# Patient Record
Sex: MALE | Race: WHITE | NOT HISPANIC OR LATINO | Employment: OTHER | URBAN - METROPOLITAN AREA
[De-identification: names, ages, dates, MRNs, and addresses within clinical notes are randomized per-mention and may not be internally consistent; named-entity substitution may affect disease eponyms.]

---

## 2017-12-04 ENCOUNTER — ALLSCRIPTS OFFICE VISIT (OUTPATIENT)
Dept: OTHER | Facility: OTHER | Age: 62
End: 2017-12-04

## 2017-12-05 ENCOUNTER — GENERIC CONVERSION - ENCOUNTER (OUTPATIENT)
Dept: GASTROENTEROLOGY | Facility: CLINIC | Age: 62
End: 2017-12-05

## 2018-01-23 NOTE — MISCELLANEOUS
Provider Comments  Provider Comments:   Dear MR Cuba Donovan have missed your appointment with Dr Viri Aldana on 12/4/17  Please call our office to reschedule at [phone]      Thank you,  [facility]      Signatures   Electronically signed by : Rupal Valenzuela MD; Dec  5 2017 11:29AM EST                       (Author)

## 2018-03-07 NOTE — PROGRESS NOTES
"  Discussion/Summary  Normal device function      Results/Data  Results   Cardiac Device In Clinic [id number] 11:29AM Sonam Blue     Test Name Result Flag Reference   MISCELLANEOUS COMMENT      DEVICE INTERROGATED IN THE [facility] OFFICE  BATTERY VOLTAGE ADEQUATE  NO SIGNIFICANT HIGH RATE EPISODES  ALL LEAD PARAMETERS WITHIN NORMAL LIMITS   1%  OPTI-VOL WITHIN NORMAL LIMITS  NORMAL DEVICE FUNCTION  --ARMANDO   Cardiac Electrophysiology Report      [id number]  pdf   DEVICE TYPE ICD       Cardiac Electrophysiology Report [id number] 11:29AM Sonam Blue     Test Name Result Flag Reference   Cardiac Electrophysiology Report      [id number]  pdf     Signatures   Electronically signed by : Adalid Gutierrez, ; Mar 30 2016  8:37AM EST                       (Author)    Electronically signed by : Amber Calvillo DO;  Apr  3 2016 12:21PM EST                       (Author)    "

## 2018-09-25 ENCOUNTER — TELEPHONE (OUTPATIENT)
Dept: GASTROENTEROLOGY | Facility: CLINIC | Age: 63
End: 2018-09-25

## 2018-09-25 NOTE — TELEPHONE ENCOUNTER
Clarisse Travis from [address] [place] [address] called to ask that we call him to schedule colon consult---left  with callback #

## 2018-09-28 ENCOUNTER — TELEPHONE (OUTPATIENT)
Dept: GASTROENTEROLOGY | Facility: MEDICAL CENTER | Age: 63
End: 2018-09-28

## 2018-09-28 NOTE — TELEPHONE ENCOUNTER
Adonay limon from the [facility] called - she said that Mr Jonna Mejia does not need a colonoscopy  He needs  a EGD/EUS/with EMR  Angelique's phone number is [phone]  She said she has faxed us the orders several times  I do see [facility] paperwork scanned in on 9/25/18

## 2018-10-02 NOTE — TELEPHONE ENCOUNTER
Dr Lowry,    Please look at message below  Is it ok to schedule procedure or office  Visit? Please Advise  Thanks!

## 2018-10-04 NOTE — TELEPHONE ENCOUNTER
Zane Iniguez,    Please schedule him for an office visit with Snow Lora, or me   I don't see any clinical info for reason for EUS and it looks like he has a significant cardiac history so I think it would be best for us to see him first     Thanks,    Alesha Reyes

## 2018-10-09 NOTE — TELEPHONE ENCOUNTER
I spoke with pt he's aware his appt from Dec with Dr Mishra has been rescheduled with Dr Vee on 11/2/2018  Abiola levin'lara to [employer]

## 2018-11-02 ENCOUNTER — OFFICE VISIT (OUTPATIENT)
Dept: GASTROENTEROLOGY | Facility: AMBULARY SURGERY CENTER | Age: 63
End: 2018-11-02
Payer: OTHER GOVERNMENT

## 2018-11-02 VITALS
TEMPERATURE: 98.5 F | DIASTOLIC BLOOD PRESSURE: 80 MMHG | RESPIRATION RATE: 18 BRPM | WEIGHT: 267.2 LBS | HEART RATE: 58 BPM | HEIGHT: 73 IN | BODY MASS INDEX: 35.41 KG/M2 | SYSTOLIC BLOOD PRESSURE: 120 MMHG

## 2018-11-02 DIAGNOSIS — D13.2 ADENOMATOUS DUODENAL POLYP: Primary | ICD-10-CM

## 2018-11-02 PROCEDURE — 99204 OFFICE O/P NEW MOD 45 MIN: CPT | Performed by: INTERNAL MEDICINE

## 2018-11-02 RX ORDER — EPLERENONE 25 MG/1
25 TABLET, FILM COATED ORAL DAILY
COMMUNITY

## 2018-11-02 RX ORDER — LISINOPRIL 40 MG/1
1 TABLET ORAL DAILY
COMMUNITY
Start: 2013-08-28

## 2018-11-02 RX ORDER — METOPROLOL SUCCINATE 100 MG/1
1 TABLET, EXTENDED RELEASE ORAL
COMMUNITY
Start: 2015-12-10

## 2018-11-02 RX ORDER — BIOTIN 1 MG
TABLET ORAL
COMMUNITY
Start: 2014-01-27

## 2018-11-02 RX ORDER — ATORVASTATIN CALCIUM 80 MG/1
1 TABLET, FILM COATED ORAL DAILY
COMMUNITY
Start: 2014-01-30

## 2018-11-02 RX ORDER — ROSUVASTATIN CALCIUM 40 MG/1
40 TABLET, COATED ORAL DAILY
COMMUNITY

## 2018-11-02 RX ORDER — AMLODIPINE BESYLATE 5 MG/1
1 TABLET ORAL DAILY
COMMUNITY
Start: 2016-05-25

## 2018-11-02 NOTE — PROGRESS NOTES
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 which she has been referred  1   Duodenal adenoma: unfortunately do not have the endoscopy report however biopsies show an adenoma  -we will schedule patient for an endoscopic ultrasound with possible EMR duodenal lesion  -discussed with him the risks of the procedure including bleeding, surgery perforation        _____________________________________________________________        CC:  Duodenal polyp    HPI:  Grey Cee is a 66 y o male who was referred for evaluation of duodenal adenoma  This is a 42-year-old gentle with history of hypertension, aortic dissection with aneurysmal dilation of his mesenteric vasculature, apparently had recent upper endoscopy which demonstrated adenomatous polyp in his duodenum  He do not have the actual endoscopy report with biopsies are available to us  He has had recent imaging which demonstrated the dissection and aneurysmal dilation  The he is on multiple antihypertensive medications continues smoke a pack a day  Denies any abdominal pain, nausea, vomiting, diarrhea, constipation, melena, rectal bleeding  Previously worked in construction  Patient is unable to provide exact history findings on his endoscopy  ROS:  The remainder of the ROS was negative except for the pertinent positives mentioned in HPI  Allergies: Patient has no known allergies      Medications:   Current Outpatient Prescriptions:     amLODIPine (NORVASC) 5 mg tablet, Take 1 tablet by mouth daily, Disp: , Rfl:     aspirin 81 MG tablet, Take 1 tablet by mouth daily, Disp: , Rfl:     atorvastatin (LIPITOR) 80 mg tablet, Take 1 tablet by mouth daily, Disp: , Rfl:     Cholecalciferol (VITAMIN D3) 1000 units CAPS, Take by mouth, Disp: , Rfl:     cyanocobalamin 100 MCG tablet, Take 100 mcg by mouth daily, Disp: , Rfl:     eplerenone (INSPRA) 25 mg tablet, Take 25 mg by mouth daily, Disp: , Rfl:     ERGOCALCIFEROL PO, Take by mouth, Disp: , Rfl:     lisinopril (ZESTRIL) 40 mg tablet, Take 1 tablet by mouth daily, Disp: , Rfl:     metFORMIN (GLUCOPHAGE) 500 mg tablet, Take 1 tablet by mouth 2 (two) times a day, Disp: , Rfl:     metoprolol succinate (TOPROL-XL) 100 mg 24 hr tablet, Take 1 tablet by mouth, Disp: , Rfl:     rosuvastatin (CRESTOR) 40 MG tablet, Take 40 mg by mouth daily, Disp: , Rfl: '    No past medical history on file  No past surgical history on file  No family history on file  reports that he has been smoking  He has never used smokeless tobacco  He reports that he drinks alcohol  He reports that he does not use drugs            Physical Exam:     /80 (BP Location: Left arm, Patient Position: Sitting, Cuff Size: Standard)   Pulse 58   Temp 98 5 °F (36 9 °C) (Tympanic)   Resp 18   Ht 6' 1" (1 854 m)   Wt 121 kg (267 lb 3 2 oz)   BMI 35 25 kg/m²     Gen: wn/wd, NAD, overweight  HEENT: anicteric, MMM, no cervical LAD  CVS: RRR, no m/r/g  CHEST: CTA b/l  ABD: +BS, soft, , ventral hernia NT,ND, no hepatosplenomegaly  EXT: no c/c/e  NEURO: aaox3  SKIN: NO rashes

## 2018-11-02 NOTE — LETTER
November 2, 2018     [address] [place] [address] Drive  [address] [place] [id number]    Patient: Lennie Childers   YOB: 1955   Date of Visit: 11/2/2018       Dear Dr Kami Moise: Thank you for referring Bebeto Tran to me for evaluation  Below are my notes for this consultation  If you have questions, please do not hesitate to call me  I look forward to following your patient along with you  Sincerely,        Krys Cm MD        CC: No Recipients  Krys Cm MD  11/2/2018  6:00 PM  Sign at close encounter  Consultation - [facility]  Lennie Childers 66 y o  male MRN: [id number]          Assessment & Plan:    Pleasant 66year-old gentle with a history of aortic dissection and aneurysmal dilation which has been followed  On recent endoscopy found to have a 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 which she has been referred  1   Duodenal adenoma: unfortunately do not have the endoscopy report however biopsies show an adenoma  -we will schedule patient for an endoscopic ultrasound with possible EMR duodenal lesion  -discussed with him the risks of the procedure including bleeding, surgery perforation        _____________________________________________________________        CC:  Duodenal polyp    HPI:  Lennie Childers is a 66 y o male who was referred for evaluation of duodenal adenoma  This is a 81-year-old gentle with history of hypertension, aortic dissection with aneurysmal dilation of his mesenteric vasculature, apparently had recent upper endoscopy which demonstrated adenomatous polyp in his duodenum  He do not have the actual endoscopy report with biopsies are available to us  He has had recent imaging which demonstrated the dissection and aneurysmal dilation  The he is on multiple antihypertensive medications continues smoke a pack a day  Denies any abdominal pain, nausea, vomiting, diarrhea, constipation, melena, rectal bleeding    Previously worked in construction  Patient is unable to provide exact history findings on his endoscopy  ROS:  The remainder of the ROS was negative except for the pertinent positives mentioned in HPI  Allergies: Patient has no known allergies  Medications:   Current Outpatient Prescriptions:     amLODIPine (NORVASC) 5 mg tablet, Take 1 tablet by mouth daily, Disp: , Rfl:     aspirin 81 MG tablet, Take 1 tablet by mouth daily, Disp: , Rfl:     atorvastatin (LIPITOR) 80 mg tablet, Take 1 tablet by mouth daily, Disp: , Rfl:     Cholecalciferol (VITAMIN D3) 1000 units CAPS, Take by mouth, Disp: , Rfl:     cyanocobalamin 100 MCG tablet, Take 100 mcg by mouth daily, Disp: , Rfl:     eplerenone (INSPRA) 25 mg tablet, Take 25 mg by mouth daily, Disp: , Rfl:     ERGOCALCIFEROL PO, Take by mouth, Disp: , Rfl:     lisinopril (ZESTRIL) 40 mg tablet, Take 1 tablet by mouth daily, Disp: , Rfl:     metFORMIN (GLUCOPHAGE) 500 mg tablet, Take 1 tablet by mouth 2 (two) times a day, Disp: , Rfl:     metoprolol succinate (TOPROL-XL) 100 mg 24 hr tablet, Take 1 tablet by mouth, Disp: , Rfl:     rosuvastatin (CRESTOR) 40 MG tablet, Take 40 mg by mouth daily, Disp: , Rfl: '    No past medical history on file  No past surgical history on file  No family history on file  reports that he has been smoking  He has never used smokeless tobacco  He reports that he drinks alcohol  He reports that he does not use drugs            Physical Exam:     /80 (BP Location: Left arm, Patient Position: Sitting, Cuff Size: Standard)   Pulse 58   Temp 98 5 °F (36 9 °C) (Tympanic)   Resp 18   Ht 6' 1" (1 854 m)   Wt 121 kg (267 lb 3 2 oz)   BMI 35 25 kg/m²      Gen: wn/wd, NAD, overweight  HEENT: anicteric, MMM, no cervical LAD  CVS: RRR, no m/r/g  CHEST: CTA b/l  ABD: +BS, soft, , ventral hernia NT,ND, no hepatosplenomegaly  EXT: no c/c/e  NEURO: aaox3  SKIN: NO rashes

## 2018-11-15 ENCOUNTER — TELEPHONE (OUTPATIENT)
Dept: GASTROENTEROLOGY | Facility: CLINIC | Age: 63
End: 2018-11-15

## 2018-11-15 NOTE — TELEPHONE ENCOUNTER
----- Message from Roro Castro sent at 11/5/2018  9:46 AM EST -----  Regarding: RADIAL EUS  Contact: [phone]  Good Morning Vivien Greenberg- per Dr Frankey Bryant can you please schedule this pt for RADIAL EUS W/ POLYPECTOMY FOR (DUODENAL POLYP)  PER [place] it doesn;t look like Dr Jasper Dumas has any time at the [facility] so she said to forward this to you  Thank you so much!

## 2018-11-20 ENCOUNTER — TELEPHONE (OUTPATIENT)
Dept: GASTROENTEROLOGY | Facility: AMBULARY SURGERY CENTER | Age: 63
End: 2018-11-20

## 2018-11-28 NOTE — TELEPHONE ENCOUNTER
EUS scheduled with Dr Andrea in [place] on 12/26/2018  I gave pt verbal instructions/mailed   Pt is Diabetic

## 2018-12-25 ENCOUNTER — ANESTHESIA EVENT (OUTPATIENT)
Dept: GASTROENTEROLOGY | Facility: HOSPITAL | Age: 63
End: 2018-12-25
Payer: OTHER GOVERNMENT

## 2018-12-25 NOTE — ANESTHESIA PREPROCEDURE EVALUATION
Review of Systems/Medical History  Patient summary reviewed  Chart reviewed  No history of anesthetic complications     Cardiovascular  EKG reviewed, Exercise tolerance (METS): >4, Exercise comment: Walks regularly at the gym for ~1 hr Pacemaker/AICD, Hyperlipidemia, Hypertension controlled, Valvular heart disease (mild-moderate MR) , mitral regurgitation, Past MI (~2013) > 6 months, CAD , CHF (EF ~25%) compensated CHF, Aortic disease (thoracoabdominal aortic aneurysm with chronic dissection),   Comment: Echo (5/12/14):  LEFT VENTRICLE:   The ventricle was markedly dilated  Systolic function was severely reduced  Ejection fraction was estimated to be   22 % (Ejection fraction by fractional shortening 26%, EF by Teichholz 27%, by   Wolm Abide 22%, EF by disc summation method 25%, myocardial performance   index 0 81 (normal 0 39+/_ 0 05), +dP/dt (LV contractality) 696mmHg/s (normal   =/> 4556), LV peak systolic longitudinal strain 7 1%)  LV stroke volume 71 ml, CO 4 L/min, CI 1 65 L/min/m2)  There was akinesis of the apical anterior, basal-mid anteroseptal, basal-mid   inferoseptal, apical inferior, apical septal, and apical wall(s)  Mass was   definitely increased  The changes were consistent with eccentric hypertrophy   (LV mass 306 gm, LVMI 124 r/m2, RWT 0 29)  Features were consistent with a   pseudonormal left ventricular filling pattern, with concomitant abnormal   relaxation and increased filling pressure (grade 2 diastolic dysfunction)  RIGHT VENTRICLE:   The size was normal    Systolic function was normal    Wall thickness was mildly to moderately increased  LEFT ATRIUM:   The atrium was moderately dilated (calculated LA volume 90 ml, DEDRICK 37 ml/m2)  MITRAL VALVE:   There was mild to moderate regurgitation  Regurgitation grade was 1-2+ on a scale of 0 to 4+  AORTA:   The root exhibited mild dilatation ( maximum AP diameter 40 mm) and mild   fibrocalcific change     There was mild dilatation of the abdominal aorta (maximum AP diameter 43 mm)  Stress Test (9/12/14):  SUMMARY:   -  Rest ECG: Sinus bradycardia  There was evidence of an old, anterior   myocardial infarction  -  Stress results: Duration of pharmacologic stress was   3 min  There was no chest pain during stress  -  ECG conclusions: The stress   ECG was equivocal for ischemia  Arrhythmia during stress: isolated premature   ventricular beats  -  Perfusion imaging: The left ventricle was severely   dilated  There was a large, severe, partially reversible myocardial perfusion   defect of the entire anteroapical wall  -  Gated SPECT: The calculated left   ventricular ejection fraction was 25 %  There was moderate global left   ventricular hypokinesis  There was severely reduced myocardial thickening and   motion of the apical wall and anterior wall of the left ventricle  CTA Chest (5/14/15):  1   Stable ascending aortic aneurysm measuring 41 mm    2   Interval growth of the descending thoracic aorta at the level of   the chronic dissection  Previously, the aorta measured 48 x 40 mm, and   currently measures 49 x 50 mm     3   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    ,  Pulmonary  Smoker cigarette smoker  , Tobacco cessation counseling given ,        GI/Hepatic      Comment: Duodenal adenoma     Negative  ROS        Endo/Other  Diabetes type 2 Oral agent,   Obesity (BMI 35 2)  morbid obesity   GYN       Hematology  Negative hematology ROS      Musculoskeletal  Negative musculoskeletal ROS        Neurology  Negative neurology ROS      Psychology   Negative psychology ROS              Physical Exam    Airway    Mallampati score: II  TM Distance: >3 FB  Neck ROM: full     Dental   Comment: Multiple pre-existing missing teeth,     Cardiovascular  Rhythm: regular, Rate: normal, Cardiovascular exam normal    Pulmonary  Pulmonary exam normal Breath sounds clear to auscultation,     Other Findings        Anesthesia Plan  ASA Score- 4     Anesthesia Type- IV sedation with anesthesia with ASA Monitors  Additional Monitors:   Airway Plan:         Plan Factors- Patient instructed to abstain from smoking on day of procedure  Patient smoked on day of surgery  Induction- intravenous  Postoperative Plan-     Informed Consent- Anesthetic plan and risks discussed with patient  I personally reviewed this patient with the CRNA  Discussed and agreed on the Anesthesia Plan with the CRNA         NPO verified  NKDA  Patient takes metoprolol XL and last took it yesterday morning  Will give IV BB intraop as hemodynamics permit  AICD was interrogated pre-op today (12/26/18) by [employer] representative  Battery life is good, patient is not pacer dependent, and patient has never been defibrillated by his device  Will place magnet on AICD as given that GI will be using cautery for the procedure  As per [employer] rep, no need for interrogation post-op after magnet removal     Plan:  IV sedation/MAC; GA as backup    Risks and benefits discussed with patient  Questions answered  Patient consented

## 2018-12-26 ENCOUNTER — ANESTHESIA (OUTPATIENT)
Dept: GASTROENTEROLOGY | Facility: HOSPITAL | Age: 63
End: 2018-12-26
Payer: OTHER GOVERNMENT

## 2018-12-26 ENCOUNTER — HOSPITAL ENCOUNTER (OUTPATIENT)
Facility: HOSPITAL | Age: 63
Setting detail: OUTPATIENT SURGERY
Discharge: HOME | End: 2018-12-26
Attending: INTERNAL MEDICINE | Admitting: INTERNAL MEDICINE
Payer: OTHER GOVERNMENT

## 2018-12-26 VITALS
RESPIRATION RATE: 16 BRPM | OXYGEN SATURATION: 94 % | HEIGHT: 73 IN | HEART RATE: 64 BPM | SYSTOLIC BLOOD PRESSURE: 122 MMHG | WEIGHT: 260 LBS | DIASTOLIC BLOOD PRESSURE: 74 MMHG | TEMPERATURE: 98.3 F | BODY MASS INDEX: 34.46 KG/M2

## 2018-12-26 DIAGNOSIS — D13.2 ADENOMATOUS DUODENAL POLYP: ICD-10-CM

## 2018-12-26 LAB — GLUCOSE SERPL-MCNC: 103 MG/DL (ref 65–140)

## 2018-12-26 PROCEDURE — 88305 TISSUE EXAM BY PATHOLOGIST: CPT | Performed by: PATHOLOGY

## 2018-12-26 PROCEDURE — 82948 REAGENT STRIP/BLOOD GLUCOSE: CPT

## 2018-12-26 PROCEDURE — 43237 ENDOSCOPIC US EXAM ESOPH: CPT | Performed by: INTERNAL MEDICINE

## 2018-12-26 PROCEDURE — 43254 EGD ENDO MUCOSAL RESECTION: CPT | Performed by: INTERNAL MEDICINE

## 2018-12-26 RX ORDER — PROPOFOL 10 MG/ML
INJECTION, EMULSION INTRAVENOUS CONTINUOUS PRN
Status: DISCONTINUED | OUTPATIENT
Start: 2018-12-26 | End: 2018-12-26 | Stop reason: SURG

## 2018-12-26 RX ORDER — SODIUM CHLORIDE 9 MG/ML
100 INJECTION, SOLUTION INTRAVENOUS CONTINUOUS
Status: DISCONTINUED | OUTPATIENT
Start: 2018-12-26 | End: 2018-12-26 | Stop reason: HOSPADM

## 2018-12-26 RX ORDER — PROPOFOL 10 MG/ML
INJECTION, EMULSION INTRAVENOUS AS NEEDED
Status: DISCONTINUED | OUTPATIENT
Start: 2018-12-26 | End: 2018-12-26 | Stop reason: SURG

## 2018-12-26 RX ORDER — PROPOFOL 10 MG/ML
INJECTION, EMULSION INTRAVENOUS CONTINUOUS PRN
Status: DISCONTINUED | OUTPATIENT
Start: 2018-12-26 | End: 2018-12-26

## 2018-12-26 RX ORDER — EPINEPHRINE 1 MG/ML
INJECTION, SOLUTION, CONCENTRATE INTRAVENOUS AS NEEDED
Status: DISCONTINUED | OUTPATIENT
Start: 2018-12-26 | End: 2018-12-26 | Stop reason: HOSPADM

## 2018-12-26 RX ADMIN — PROPOFOL 150 MG: 10 INJECTION, EMULSION INTRAVENOUS at 11:24

## 2018-12-26 RX ADMIN — PROPOFOL 150 MCG/KG/MIN: 10 INJECTION, EMULSION INTRAVENOUS at 11:24

## 2018-12-26 RX ADMIN — GLUCAGON HYDROCHLORIDE 0.5 MG: KIT at 11:56

## 2018-12-26 RX ADMIN — SODIUM CHLORIDE: 0.9 INJECTION, SOLUTION INTRAVENOUS at 11:10

## 2018-12-26 RX ADMIN — SODIUM CHLORIDE 100 ML/HR: 0.9 INJECTION, SOLUTION INTRAVENOUS at 11:09

## 2018-12-26 NOTE — DISCHARGE INSTR - AVS FIRST PAGE
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 2]    Procedure(s) (LRB):  RADIAL ENDOSCOPIC U/S WITH POLYPECTOMY (N/A)    Specimen(s):  ID Type Source Tests Collected by Time Destination   1 : duodnal polyp Tissue Polyp, Stomach/Small Intestine TISSUE EXAM Rivka Hlae MD 2018 1202        Estimated Blood Loss:   Minimal    ENDOSCOPIC ULTRASOUND    SEDATION: Monitored anesthesia care, check anesthesia records    ASA Class:      INDICATIONS:  Duodenal adenomatous polyp    CONSENT:  Informed consent was obtained for the procedure, including sedation after explaining the risks and benefits of the procedure  Risks including but not limited to bleeding, perforation, infection, and missed lesion  PREPARATION:   Telemetry, pulse oximetry, blood pressure were monitored throughout the procedure  Patient was identified by myself both verbally and by visual inspection of ID band  DESCRIPTION:   Patient was placed in the left lateral decubitus position and was sedated with the above medication  The gastroscope was introduced in to the oropharynx and the esophagus was intubated under direct visualization  Scope was passed down the esophagus up to 2nd part of the duodenum  A careful inspection was made as the gastroscope was withdrawn, including a retroflexed view of the stomach; findings and interventions are described below  FINDINGS:    EGD FINDINGS:  Limited endoscopic view with oblique viewing echoendoscope was unremarkable  #1  Esophagus- normal   GE junction at 43 cm  #2  Stomach- normal     #3  Duodenum- normal duodenal bulb and 2nd portion  In the 3rd portion there was a flat polyp seen involving about 40% of the circumference    This measured approximately 2 cm  Endoscopic ultrasound did not show any submucosal spread  EMR  Using a mixture of saline, methylene blue and diluted epinephrine the area was injected to raise the polyp  Approximately 80% of the polyp was removed using a snare  The other 20% could not be successfully captured using the snare  Three  clips were placed  I had thought about trying APC to ablate the residual polyp however decided against that and will try to remove the polyp at another time and if it is unsuccessful can try APC then  EUS FINDINGS:  Radial echoendoscope was used  No significant abnormality was seen in the pancreas  The pancreatic duct appeared to be normal   The common bile duct was normal   The visualized areas of the liver were normal   The area of the polyp was evaluated and no submucosal spread was seen  IMPRESSIONS:      1  Duodenal polyp in the 3rd portion  This was flat and measured approximately 2 cm  Approximately 80% of the polyp was removed using lift and snare  Clips were placed  The other 20% could not be successfully captured within the snare  RECOMMENDATIONS:     1  Follow up with the biopsy results with Dr Shilpa Belcher in 2 weeks  2  Repeat endoscopy in 3 months for removal of the residual polyp  If not successful then we can plan for APC to ablate the area  COMPLICATIONS:  None; patient tolerated the procedure well            DISPOSITION: PACU           CONDITION: Stable

## 2018-12-26 NOTE — OP NOTE
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]    Post-Op Diagnosis Codes:     * Adenomatous duodenal polyp [D13 2]    Procedure(s) (LRB):  RADIAL ENDOSCOPIC U/S WITH POLYPECTOMY (N/A)    Specimen(s):  ID Type Source Tests Collected by Time Destination   1 : duodnal polyp Tissue Polyp, Stomach/Small Intestine TISSUE EXAM Scarlett Mojica MD 2018 1202        Estimated Blood Loss:   Minimal    ENDOSCOPIC ULTRASOUND    SEDATION: Monitored anesthesia care, check anesthesia records    ASA Class:      INDICATIONS:  Duodenal adenomatous polyp    CONSENT:  Informed consent was obtained for the procedure, including sedation after explaining the risks and benefits of the procedure  Risks including but not limited to bleeding, perforation, infection, and missed lesion  PREPARATION:   Telemetry, pulse oximetry, blood pressure were monitored throughout the procedure  Patient was identified by myself both verbally and by visual inspection of ID band  DESCRIPTION:   Patient was placed in the left lateral decubitus position and was sedated with the above medication  The gastroscope was introduced in to the oropharynx and the esophagus was intubated under direct visualization  Scope was passed down the esophagus up to 2nd part of the duodenum  A careful inspection was made as the gastroscope was withdrawn, including a retroflexed view of the stomach; findings and interventions are described below  FINDINGS:    EGD FINDINGS:  Limited endoscopic view with oblique viewing echoendoscope was unremarkable  #1  Esophagus- normal   GE junction at 43 cm  #2  Stomach- normal     #3  Duodenum- normal duodenal bulb and 2nd portion  In the 3rd portion there was a flat polyp seen involving about 40% of the circumference    This measured approximately 2 cm  Endoscopic ultrasound did not show any submucosal spread  EMR  Using a mixture of saline, methylene blue and diluted epinephrine the area was injected to raise the polyp  Approximately 80% of the polyp was removed using a snare  The other 20% could not be successfully captured using the snare  Three  clips were placed  I had thought about trying APC to ablate the residual polyp however decided against that and will try to remove the polyp at another time and if it is unsuccessful can try APC then  EUS FINDINGS:  Radial echoendoscope was used  No significant abnormality was seen in the pancreas  The pancreatic duct appeared to be normal   The common bile duct was normal   The visualized areas of the liver were normal   The area of the polyp was evaluated and no submucosal spread was seen  IMPRESSIONS:      1  Duodenal polyp in the 3rd portion  This was flat and measured approximately 2 cm  Approximately 80% of the polyp was removed using lift and snare  Clips were placed  The other 20% could not be successfully captured within the snare  RECOMMENDATIONS:     1  Follow up with the biopsy results with Dr Lonny Kendall in 2 weeks  2  Repeat endoscopy in 3 months for removal of the residual polyp  If not successful then we can plan for APC to ablate the area  COMPLICATIONS:  None; patient tolerated the procedure well            DISPOSITION: PACU           CONDITION: Stable

## 2018-12-26 NOTE — H&P
History and Physical -  [facility]  Bette Colon 66 y o  male MRN: [id number]    HPI: Bette Colon is a 66y o  year old male who presents with history of duodenal polyp  Now for EGD and possible EMR  Review of Systems    Historical Information   Past Medical History:   Diagnosis Date    Aortic disease ([place] [address] )     CAD (coronary artery disease)     CHF (congestive heart failure) ([place] [address] )     Diabetes mellitus ([place] [address] )     Hyperlipidemia     Hypertension     ICD (implantable 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     Mitral regurgitation     Myocardial infarction ([place] [address] )     Valvular heart disease      Past Surgical History:   Procedure Laterality Date    COLONOSCOPY      ESOPHAGOGASTRODUODENOSCOPY      INSERT / REPLACE / REMOVE PACEMAKER      TONSILLECTOMY       Social History   History   Alcohol Use    Yes     Comment: 3X/WEEK 6 PACK BEER     History   Drug Use No     History   Smoking Status    Current Every Day Smoker   Smokeless Tobacco    Never Used     History reviewed  No pertinent family history  Meds/Allergies     Prescriptions Prior to Admission   Medication    amLODIPine (NORVASC) 5 mg tablet    aspirin 81 MG tablet    atorvastatin (LIPITOR) 80 mg tablet    Cholecalciferol (VITAMIN D3) 1000 units CAPS    cyanocobalamin 100 MCG tablet    eplerenone (INSPRA) 25 mg tablet    ERGOCALCIFEROL PO    lisinopril (ZESTRIL) 40 mg tablet    metFORMIN (GLUCOPHAGE) 500 mg tablet    metoprolol succinate (TOPROL-XL) 100 mg 24 hr tablet    rosuvastatin (CRESTOR) 40 MG tablet       No Known Allergies    Objective     Blood pressure 144/80, pulse (!) 50, temperature 98 3 °F (36 8 °C), temperature source Oral, resp  rate 18, height 6' 1" (1 854 m), weight 118 kg (260 lb), SpO2 96 %  PHYSICAL EXAM    Gen: NAD  CV: RRR  CHEST: Clear  ABD: soft, NT/ND  EXT: no edema  Neuro: AAO      ASSESSMENT/PLAN:  This is a 66y o  year old male here for duodenal polyp/adenoma    Plan for EUS/EGD/EMR  PLAN:   Procedure:  EUS/EGD/EMR

## 2018-12-26 NOTE — ANESTHESIA POSTPROCEDURE EVALUATION
Post-Op Assessment Note      CV Status:  Stable    Mental Status:  Awake    Hydration Status:  Stable    PONV Controlled:  None    Airway Patency:  Patent    Post Op Vitals Reviewed: Yes          Staff: CRNA           BP   111/70   Temp      Pulse 71   Resp   20   SpO2   95

## 2019-06-06 ENCOUNTER — TELEPHONE (OUTPATIENT)
Dept: GASTROENTEROLOGY | Facility: CLINIC | Age: 64
End: 2019-06-06

## 2019-06-10 ENCOUNTER — TELEPHONE (OUTPATIENT)
Dept: GASTROENTEROLOGY | Facility: CLINIC | Age: 64
End: 2019-06-10

## 2019-07-08 ENCOUNTER — TRANSCRIBE ORDERS (OUTPATIENT)
Dept: PHYSICAL THERAPY | Age: 64
End: 2019-07-08

## 2019-07-08 ENCOUNTER — EVALUATION (OUTPATIENT)
Dept: PHYSICAL THERAPY | Age: 64
End: 2019-07-08
Payer: COMMERCIAL

## 2019-07-08 DIAGNOSIS — M75.41 IMPINGEMENT SYNDROME OF RIGHT SHOULDER: Primary | ICD-10-CM

## 2019-07-08 PROCEDURE — 97161 PT EVAL LOW COMPLEX 20 MIN: CPT | Performed by: PHYSICAL THERAPIST

## 2019-07-08 NOTE — PROGRESS NOTES
PT Evaluation     Today's date: 2019  Patient name: Sadie Rahman  : 1955  MRN: [id number]  Referring provider: Antonieta Coburn MD  Dx:   Encounter Diagnosis     ICD-10-CM    1  Impingement syndrome of right shoulder M75 41                   Assessment  Assessment details: Patient seen for PT evaluation for R shoulder pain  Patient presents with rotator cuff weakness, possible impingement syndrome  Presents with scapular weakness on R compared to L, delayed movement with shoulder flexion motion  Initiated Hep for gentle rotator cuff and scapular strengthening  Recommended PT for 2x/week x 6-8 weeks  Patient eager to return to work if his shoulder pain will allow  Impairments: abnormal or restricted ROM, activity intolerance, impaired physical strength, lacks appropriate home exercise program, pain with function, scapular dyskinesis, poor posture  and poor body mechanics  Functional limitations: Patient reports shoulder pain with lifting/reaching overhead, feels weakness and pain in arm with pushing motion specifically with arm overhead position  Understanding of Dx/Px/POC: good   Prognosis: good    Goals  Impairment Goals to be met within 4 weeks  - Decrease pain to 0/10 at rest and with activity  - Improve ROM by 5-10 degrees grossly throughout  - Increase strength to 5/5 throughout  - Improve scapular control and strength on R     Functional Goals to be met within 4-6 weeks  - Return to Prior Level of Function  - Increase Functional Status Measure to: expected  - Patient will be independent with HEP  - Patient to be able to perform overhead activity x 5-10 min with pain <3/10  - Patient to be able to use R UE functionally (ie reaching, lifting, pushing/pulling) with pain <2/10  - Patient to be aware of upright posture         Plan  Patient would benefit from: skilled physical therapy  Planned modality interventions: cryotherapy and thermotherapy: hydrocollator packs  Planned therapy interventions: joint mobilization, manual therapy, neuromuscular re-education, patient education, postural training, strengthening, stretching, therapeutic activities, therapeutic exercise and home exercise program  Frequency: 2x week  Duration in weeks: 8  Treatment plan discussed with: patient        Subjective Evaluation    History of Present Illness  Mechanism of injury: Patient reports onset of R shoulder pain 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, using a screw gun/drill overhead x 2 weeks  Patient had to quit his job after 2 more weeks as he wasn't able to lift the drill overhead d/t his R shoulder pain  Patient followed up with PCP regarding his shoulder pain, was recommended to PT for shoulder pain       PMH: AAA in-operable  Pain  Current pain ratin  At best pain ratin  At worst pain ratin  Location: R shoulder  Quality: sharp, dull ache and throbbing  Aggravating factors: overhead activity and lifting  Progression: no change    Social Support    Employment status: not working    Diagnostic Tests  No diagnostic tests performed  Treatments  No previous or current treatments  Patient Goals  Patient goals for therapy: decreased pain, increased motion, increased strength and return to sport/leisure activities          Objective     Active Range of Motion   Left Shoulder   Flexion: 165 degrees   Extension: 60 degrees   Abduction: 156 degrees   External rotation BTH: T1   Internal rotation BTB: T10     Right Shoulder   Flexion: 160 degrees with pain  Extension: 50 degrees with pain  Abduction: 125 degrees with pain  External rotation BTH: C7   Internal rotation BTB: L1     Left Elbow   Normal active range of motion    Right Elbow   Normal active range of motion    Strength/Myotome Testing     Left Shoulder     Planes of Motion   Flexion: 4   Abduction: 4   External rotation at 0°: 4   Internal rotation at 0°: 4     Right Shoulder     Planes of Motion   Flexion: 4-   Abduction: 4-   External rotation at 0°: 3+   Internal rotation at 0°: 4-     Left Elbow   Normal strength    Right Elbow   Normal strength    Tests     Right Shoulder   Positive empty can and Neer's  Negative drop arm, horn blower and painful arc                Precautions AAA    Specialty Daily Treatment Diary       Manual        Joint mobs                        Exercise Diary                 UBE 5/5                Corner pec stretch?- if able        IR stretch with strap, gentle                TB rows and extension        TB IR        TB B ER                Wall push ups                Serratus ant        Prone single arm flex, abd, ext        Prone rows                        Progress as able                        Modalities

## 2019-07-08 NOTE — LETTER
2019    Negro Fox MD  [address]  [place] [address]    Patient: Olden Cheadle   YOB: 1955   Date of Visit: 2019     Encounter Diagnosis     ICD-10-CM    1  Impingement syndrome of right shoulder M75 41        Dear Dr Mao Pop:    Please review the attached Plan of Care from [facility] recent visit  Please verify that you agree therapy should continue by signing the attached document and sending it back to our office  If you have any questions or concerns, please don't hesitate to call  Sincerely,    Duncan Lisa, PT      Referring Provider:      I certify that I have read the below Plan of Care and certify the need for these services furnished under this plan of treatment while under my care  Negro Fox MD  [address]  [address]: [phone]          PT Evaluation     Today's date: 2019  Patient name: Olden Cheadle  : 1955  MRN: [id number]  Referring provider: Maged Altamirano MD  Dx:   Encounter Diagnosis     ICD-10-CM    1  Impingement syndrome of right shoulder M75 41                   Assessment  Assessment details: Patient seen for PT evaluation for R shoulder pain  Patient presents with rotator cuff weakness, possible impingement syndrome  Presents with scapular weakness on R compared to L, delayed movement with shoulder flexion motion  Initiated Hep for gentle rotator cuff and scapular strengthening  Recommended PT for 2x/week x 6-8 weeks  Patient eager to return to work if his shoulder pain will allow    Impairments: abnormal or restricted ROM, activity intolerance, impaired physical strength, lacks appropriate home exercise program, pain with function, scapular dyskinesis, poor posture  and poor body mechanics  Functional limitations: Patient reports shoulder pain with lifting/reaching overhead, feels weakness and pain in arm with pushing motion specifically with arm overhead position  Understanding of Dx/Px/POC: good   Prognosis: good    Goals  Impairment Goals to be met within 4 weeks  - Decrease pain to 0/10 at rest and with activity  - Improve ROM by 5-10 degrees grossly throughout  - Increase strength to 5/5 throughout  - Improve scapular control and strength on R     Functional Goals to be met within 4-6 weeks  - Return to Prior Level of Function  - Increase Functional Status Measure to: expected  - Patient will be independent with HEP  - Patient to be able to perform overhead activity x 5-10 min with pain <3/10  - Patient to be able to use R UE functionally (ie reaching, lifting, pushing/pulling) with pain <2/10  - Patient to be aware of upright posture  Plan  Patient would benefit from: skilled physical therapy  Planned modality interventions: cryotherapy and thermotherapy: hydrocollator packs  Planned therapy interventions: joint mobilization, manual therapy, neuromuscular re-education, patient education, postural training, strengthening, stretching, therapeutic activities, therapeutic exercise and home exercise program  Frequency: 2x week  Duration in weeks: 8  Treatment plan discussed with: patient        Subjective Evaluation    History of Present Illness  Mechanism of injury: Patient reports onset of R shoulder pain 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, using a screw gun/drill overhead x 2 weeks  Patient had to quit his job after 2 more weeks as he wasn't able to lift the drill overhead d/t his R shoulder pain  Patient followed up with PCP regarding his shoulder pain, was recommended to PT for shoulder pain       PMH: AAA in-operable  Pain  Current pain ratin  At best pain ratin  At worst pain ratin  Location: R shoulder  Quality: sharp, dull ache and throbbing  Aggravating factors: overhead activity and lifting  Progression: no change    Social Support    Employment status: not working    Diagnostic Tests  No diagnostic tests performed  Treatments  No previous or current treatments  Patient Goals  Patient goals for therapy: decreased pain, increased motion, increased strength and return to sport/leisure activities          Objective     Active Range of Motion   Left Shoulder   Flexion: 165 degrees   Extension: 60 degrees   Abduction: 156 degrees   External rotation BTH: T1   Internal rotation BTB: T10     Right Shoulder   Flexion: 160 degrees with pain  Extension: 50 degrees with pain  Abduction: 125 degrees with pain  External rotation BTH: C7   Internal rotation BTB: L1     Left Elbow   Normal active range of motion    Right Elbow   Normal active range of motion    Strength/Myotome Testing     Left Shoulder     Planes of Motion   Flexion: 4   Abduction: 4   External rotation at 0°:  4   Internal rotation at 0°:  4     Right Shoulder     Planes of Motion   Flexion: 4-   Abduction: 4-   External rotation at 0°:  3+   Internal rotation at 0°:  4-     Left Elbow   Normal strength    Right Elbow   Normal strength    Tests     Right Shoulder   Positive empty can and Neer's  Negative drop arm, horn blower and painful arc                Precautions AAA    Specialty Daily Treatment Diary       Manual        Joint mobs                        Exercise Diary                 UBE 5/5                Corner pec stretch?- if able        IR stretch with strap, gentle                TB rows and extension        TB IR        TB B ER                Wall push ups                Serratus ant        Prone single arm flex, abd, ext        Prone rows                        Progress as able                        Modalities

## 2019-07-11 ENCOUNTER — OFFICE VISIT (OUTPATIENT)
Dept: PHYSICAL THERAPY | Age: 64
End: 2019-07-11
Payer: COMMERCIAL

## 2019-07-11 DIAGNOSIS — M75.41 IMPINGEMENT SYNDROME OF RIGHT SHOULDER: Primary | ICD-10-CM

## 2019-07-11 PROCEDURE — 97110 THERAPEUTIC EXERCISES: CPT

## 2019-07-11 NOTE — PROGRESS NOTES
Daily Note     Today's date: 2019  Patient name: Alphonso Nissen  : 1955  MRN: [id number]  Referring provider: Evi Adams MD  Dx:   Encounter Diagnosis     ICD-10-CM    1  Impingement syndrome of right shoulder M75 41                   Subjective: Reports no change in pain since IE  " I still hurts at times when I use the right arm"      Objective: See treatment diary below  Manual             Joint mobs                                         Exercise Diary                            UBE                          Corner pec stretch?- if able  [id number]           IR stretch with strap, gentle  60wifo2            s/lER  1# x20           TB rows and extension  red 2x10           TB IR  green 2x10           TB B ER  [id number]                         Wall push ups                           Serratus ant  2# x20           Prone single arm flex, abd, ext  ext,abd 2x10           Prone rows                                         Progress as able                                         Modalities              CP to R shld  5min                                           Assessment: Tolerated treatment well  Focus was in initiating active ex in pain free range   Pt did have some R shld soreness initially after ex which subsided after ice      Plan: Cont PT per plan of care     Precautions AAA    Specialty Daily Treatment Diary       Manual        Joint mobs                        Exercise Diary                 UBE                 Corner pec stretch?- if able        IR stretch with strap, gentle                TB rows and extension        TB IR        TB B ER                Wall push ups                Serratus ant        Prone single arm flex, abd, ext        Prone rows                        Progress as able                        Modalities

## 2019-07-16 ENCOUNTER — OFFICE VISIT (OUTPATIENT)
Dept: PHYSICAL THERAPY | Age: 64
End: 2019-07-16
Payer: COMMERCIAL

## 2019-07-16 DIAGNOSIS — M75.41 IMPINGEMENT SYNDROME OF RIGHT SHOULDER: Primary | ICD-10-CM

## 2019-07-16 PROCEDURE — 97140 MANUAL THERAPY 1/> REGIONS: CPT

## 2019-07-16 PROCEDURE — 97110 THERAPEUTIC EXERCISES: CPT

## 2019-07-16 NOTE — PROGRESS NOTES
Daily Note     Today's date: 2019  Patient name: Ej Lee  : 1955  MRN: [id number]  Referring provider: Sampson Moya MD  Dx:   Encounter Diagnosis     ICD-10-CM    1  Impingement syndrome of right shoulder M75 41                   Subjective: Reports no change in pain since IE  " I still hurts at times when I use the right arm"  " It cracks and hurts" Pt did play 18 holes of golf over weekend with no R shld pain"      Objective: See treatment diary below  Manual             Joint mobs                                         Exercise Diary                          UBE                        Corner pec stretch?- if able  [id number]  [id number]         IR stretch with strap, gentle  07ganj9  62vvob8          s/lER  1# x20  1# x20         TB rows and extension  red 2x10  green 2x10         TB IR  green 2x10           TB B ER  [id number]  red 3x10                       Wall push ups                           Serratus ant  2# x20  2# x20         Prone single arm flex, abd, ext  ext,abd 2x10  2x10 ext,abd         Prone rows                                         Progress as able                                         Modalities              CP to R shld  5min                                           Assessment: Tolerated treatment well  Focus was in initiating active ex in pain free range  Handouts were issued for home ex  Noted less R shld discomfort after ex with today's session vs last tx      Plan: Cont PT per plan of care

## 2019-07-18 ENCOUNTER — OFFICE VISIT (OUTPATIENT)
Dept: PHYSICAL THERAPY | Age: 64
End: 2019-07-18
Payer: COMMERCIAL

## 2019-07-18 DIAGNOSIS — M75.41 IMPINGEMENT SYNDROME OF RIGHT SHOULDER: Primary | ICD-10-CM

## 2019-07-18 PROCEDURE — 97110 THERAPEUTIC EXERCISES: CPT

## 2019-07-18 NOTE — PROGRESS NOTES
Daily Note     Today's date: 2019  Patient name: Cloretta Hodgkins  : 1955  MRN: [id number]  Referring provider: Rubia Costa MD  Dx:   Encounter Diagnosis     ICD-10-CM    1  Impingement syndrome of right shoulder M75 41                   Subjective: Reports no change in pain since IE  " I still hurts at times when I use the right arm"  " It cracks and hurts" Pt did play 18 holes of golf over weekend with no R shld pain"      Objective: See treatment diary below  Manual             Joint mobs                                         Exercise Diary                        UBE                      Corner pec stretch?- if able  [id number]  [id number]  64izaq35       IR stretch with strap, gentle  25egvv8  27ebbc8  65zphw9        s/lER and abd  1# x20  1# x20  2# x20       TB rows and extension  red 2x10  green 2x10  green 2x10       TB IR  green 2x10           TB B ER  [id number]  red 3x10  green 2x10                     Wall push ups                           Serratus ant  2# x20  2# x20  3# x30       Prone single arm flex, abd, ext  ext,abd 2x10  2x10 ext,abd  1# 2x10 ext,abd       Prone rows      1# 2x10        standing shld flex and scaption      1# 2x10                     Progress as able                                         Modalities              CP to R shld  5min    declined                                       Assessment: Tolerated treatment well  Focus was in initiating active ex in pain free range  Did note shld flexion was painful at end range during PROM  Noted less R shld discomfort after ex with today's session vs last tx      Plan: Cont PT per plan of care

## 2019-07-23 ENCOUNTER — APPOINTMENT (OUTPATIENT)
Dept: PHYSICAL THERAPY | Age: 64
End: 2019-07-23
Payer: COMMERCIAL

## 2019-07-25 ENCOUNTER — APPOINTMENT (OUTPATIENT)
Dept: PHYSICAL THERAPY | Age: 64
End: 2019-07-25
Payer: COMMERCIAL

## 2019-07-29 ENCOUNTER — APPOINTMENT (OUTPATIENT)
Dept: PHYSICAL THERAPY | Age: 64
End: 2019-07-29
Payer: COMMERCIAL

## 2019-07-30 ENCOUNTER — CONSULT (OUTPATIENT)
Dept: GASTROENTEROLOGY | Facility: CLINIC | Age: 64
End: 2019-07-30
Payer: COMMERCIAL

## 2019-07-30 ENCOUNTER — APPOINTMENT (OUTPATIENT)
Dept: PHYSICAL THERAPY | Age: 64
End: 2019-07-30
Payer: COMMERCIAL

## 2019-07-30 VITALS
BODY MASS INDEX: 36.29 KG/M2 | WEIGHT: 273.8 LBS | HEART RATE: 52 BPM | DIASTOLIC BLOOD PRESSURE: 82 MMHG | HEIGHT: 73 IN | SYSTOLIC BLOOD PRESSURE: 127 MMHG | TEMPERATURE: 97 F

## 2019-07-30 DIAGNOSIS — D13.2 ADENOMATOUS DUODENAL POLYP: Primary | ICD-10-CM

## 2019-07-30 PROCEDURE — 99213 OFFICE O/P EST LOW 20 MIN: CPT | Performed by: INTERNAL MEDICINE

## 2019-07-30 RX ORDER — AMOXICILLIN 500 MG
CAPSULE ORAL
COMMUNITY

## 2019-07-30 NOTE — H&P
[facility] - Outpatient Follow-up Note  Alma Solid 60 y o  male MRN: [id number]  Encounter: [id number]          ASSESSMENT AND PLAN:      1  Adenomatous duodenal polyp: Will schedule for resection of remainder of the duodenal polyp with Dr Rohith Kidd at [facility]   ______________________________________________________________________    SUBJECTIVE:      Patient had EGD and EUS of duodenal polyp at [facility] in December 2018  He had 80% of hte polyp removed and 20 % remained  This was clipped and decision was made to bring patinet back in 3 months for complete resection/ablation of the polyp  He is here for follow up  No symptoms at this time and feels well  REVIEW OF SYSTEMS IS OTHERWISE NEGATIVE  Historical Information   Past Medical History:   Diagnosis Date    [facility] ([facility] )     CAD (coronary artery disease)     CHF (congestive heart failure) ([facility] )     Diabetes mellitus ([facility] )     Hyperlipidemia     Hypertension     ICD (implantable 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     Mitral regurgitation     Myocardial infarction ([facility] )     Valvular heart disease      Past Surgical History:   Procedure Laterality Date    COLONOSCOPY      ESOPHAGOGASTRODUODENOSCOPY      INSERT / REPLACE / REMOVE PACEMAKER      LA EDG US EXAM SURGICAL ALTER STOM DUODENUM/JEJUNUM N/A 12/26/2018    Procedure: RADIAL ENDOSCOPIC U/S WITH POLYPECTOMY;  Surgeon: Rios Peraza MD;  Location: [facility]; Service: Gastroenterology    TONSILLECTOMY       Social History   Social History     Substance and Sexual Activity   Alcohol Use Yes    Comment: 3X/WEEK 6 PACK BEER     Social History     Substance and Sexual Activity   Drug Use No     Social History     Tobacco Use   Smoking Status Current Some Day Smoker   Smokeless Tobacco Never Used   Tobacco Comment    1 OR 2 CIGS DAILY     History reviewed  No pertinent family history      Meds/Allergies       Current Outpatient Medications:    amLODIPine (NORVASC) 5 mg tablet    aspirin 81 MG tablet    Cholecalciferol (VITAMIN D3) 1000 units CAPS    eplerenone (INSPRA) 25 mg tablet    ERGOCALCIFEROL PO    lisinopril (ZESTRIL) 40 mg tablet    metFORMIN (GLUCOPHAGE) 500 mg tablet    metoprolol succinate (TOPROL-XL) 100 mg 24 hr tablet    Omega-3 Fatty Acids (FISH OIL) 1200 MG CAPS    rosuvastatin (CRESTOR) 40 MG tablet    atorvastatin (LIPITOR) 80 mg tablet    cyanocobalamin 100 MCG tablet    No Known Allergies        Objective     Blood pressure 127/82, pulse (!) 52, temperature (!) 97 °F (36 1 °C), temperature source Tympanic, height 6' 1" (1 854 m), weight 124 kg (273 lb 12 8 oz)  Body mass index is 36 12 kg/m²  PHYSICAL EXAM:      General Appearance:   Alert, cooperative, no distress   HEENT:   Normocephalic, atraumatic, anicteric      Neck:  Supple, symmetrical, trachea midline   Lungs:   Clear to auscultation bilaterally; no rales, rhonchi or wheezing; respirations unlabored    Heart[de-identified]   Regular rate and rhythm; no murmur, rub, or gallop  Abdomen:   Soft, non-tender, non-distended; normal bowel sounds; no masses, no organomegaly    Genitalia:   Deferred    Rectal:   Deferred    Extremities:  No cyanosis, clubbing or edema    Pulses:  2+ and symmetric    Skin:  No jaundice, rashes, or lesions    Lymph nodes:  No palpable cervical lymphadenopathy        Lab Results:   No visits with results within 1 Day(s) from this visit     Latest known visit with results is:   Admission on 12/26/2018, Discharged on 12/26/2018   Component Date Value    POC Glucose 12/26/2018 103     Case Report 12/26/2018                      Value:Surgical Pathology Report                         Case: [id number]                                   Authorizing Provider:  Merly Dobbs MD             Collected:           12/26/2018 1202              Ordering Location:     [address] [place] [address]      Received:            12/26/2018 [facility]                                                           Pathologist:           Waleska Nowak MD                                                              Specimen:    Polyp, Stomach/Small Intestine, duodnal polyp                                              Final Diagnosis 12/26/2018                      Value: This result contains rich text formatting which cannot be displayed here   Note 12/26/2018                      Value: This result contains rich text formatting which cannot be displayed here   Additional Information 12/26/2018                      Value: This result contains rich text formatting which cannot be displayed here  Trejo Gross Description 12/26/2018                      Value: This result contains rich text formatting which cannot be displayed here  Radiology Results:   No results found

## 2019-07-30 NOTE — PROGRESS NOTES
[facility] - Outpatient Follow-up Note  Nico Hagan 60 y o  male MRN: [id number]  Encounter: [id number]          ASSESSMENT AND PLAN:      1  Adenomatous duodenal polyp: Will schedule for resection of remainder of the duodenal polyp with Dr Krysten Berumen at [facility]   ______________________________________________________________________    SUBJECTIVE:      Patient had EGD and EUS of duodenal polyp at [facility] in December 2018  He had 80% of the polyp removed and 20% remained  This was clipped and decision was made to bring patient back in 3 months for complete resection/ablation of the polyp  He is here for follow up  No symptoms at this time and feels well  REVIEW OF SYSTEMS IS OTHERWISE NEGATIVE  Historical Information   Past Medical History:   Diagnosis Date    Aortic disease ([facility] )     CAD (coronary artery disease)     CHF (congestive heart failure) ([facility] )     Diabetes mellitus ([facility] )     Hyperlipidemia     Hypertension     ICD (implantable 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     Mitral regurgitation     Myocardial infarction ([place] [address] )     Valvular heart disease      Past Surgical History:   Procedure Laterality Date    COLONOSCOPY      ESOPHAGOGASTRODUODENOSCOPY      INSERT / REPLACE / REMOVE PACEMAKER      [place] EDG US EXAM SURGICAL ALTER STOM DUODENUM/JEJUNUM N/A 12/26/2018    Procedure: RADIAL ENDOSCOPIC U/S WITH POLYPECTOMY;  Surgeon: Eb Cho MD;  [facility]; Service: Gastroenterology    TONSILLECTOMY       Social History   Social History     Substance and Sexual Activity   Alcohol Use Yes    Comment: 3X/WEEK 6 PACK BEER     Social History     Substance and Sexual Activity   Drug Use No     Social History     Tobacco Use   Smoking Status Current Some Day Smoker   Smokeless Tobacco Never Used   Tobacco Comment    1 OR 2 CIGS DAILY     History reviewed  No pertinent family history      Meds/Allergies       Current Outpatient Medications:    amLODIPine (NORVASC) 5 mg tablet    aspirin 81 MG tablet    Cholecalciferol (VITAMIN D3) 1000 units CAPS    eplerenone (INSPRA) 25 mg tablet    ERGOCALCIFEROL PO    lisinopril (ZESTRIL) 40 mg tablet    metFORMIN (GLUCOPHAGE) 500 mg tablet    metoprolol succinate (TOPROL-XL) 100 mg 24 hr tablet    Omega-3 Fatty Acids (FISH OIL) 1200 MG CAPS    rosuvastatin (CRESTOR) 40 MG tablet    atorvastatin (LIPITOR) 80 mg tablet    cyanocobalamin 100 MCG tablet    No Known Allergies        Objective     Blood pressure 127/82, pulse (!) 52, temperature (!) 97 °F (36 1 °C), temperature source Tympanic, height 6' 1" (1 854 m), weight 124 kg (273 lb 12 8 oz)  Body mass index is 36 12 kg/m²  PHYSICAL EXAM:      General Appearance:   Alert, cooperative, no distress   HEENT:   Normocephalic, atraumatic, anicteric      Neck:  Supple, symmetrical, trachea midline   Lungs:   Clear to auscultation bilaterally; no rales, rhonchi or wheezing; respirations unlabored    Heart[de-identified]   Regular rate and rhythm; no murmur, rub, or gallop  Abdomen:   Soft, non-tender, non-distended; normal bowel sounds; no masses, no organomegaly    Genitalia:   Deferred    Rectal:   Deferred    Extremities:  No cyanosis, clubbing or edema    Pulses:  2+ and symmetric    Skin:  No jaundice, rashes, or lesions    Lymph nodes:  No palpable cervical lymphadenopathy        Lab Results:   No visits with results within 1 Day(s) from this visit     Latest known visit with results is:   Admission on 12/26/2018, Discharged on 12/26/2018   Component Date Value    POC Glucose 12/26/2018 103     Case Report 12/26/2018                      Value:Surgical Pathology Report                         Case: [id number]                                   Authorizing Provider:  Cony Grullon MD             Collected:           12/26/2018 1202              Ordering Location:     [address] [place] [address]      Received:            12/26/2018 [facility]                                                           Pathologist:           Nati Aldana MD                                                              Specimen:    Polyp, Stomach/Small Intestine, duodnal polyp                                              Final Diagnosis 12/26/2018                      Value: This result contains rich text formatting which cannot be displayed here   Note 12/26/2018                      Value: This result contains rich text formatting which cannot be displayed here   Additional Information 12/26/2018                      Value: This result contains rich text formatting which cannot be displayed here  [employer] Description 12/26/2018                      Value: This result contains rich text formatting which cannot be displayed here  Radiology Results:   No results found

## 2019-09-11 ENCOUNTER — ANESTHESIA EVENT (OUTPATIENT)
Dept: GASTROENTEROLOGY | Facility: HOSPITAL | Age: 64
End: 2019-09-11

## 2019-09-12 ENCOUNTER — ANESTHESIA (OUTPATIENT)
Dept: GASTROENTEROLOGY | Facility: HOSPITAL | Age: 64
End: 2019-09-12

## 2019-09-12 ENCOUNTER — HOSPITAL ENCOUNTER (OUTPATIENT)
Dept: GASTROENTEROLOGY | Facility: HOSPITAL | Age: 64
Setting detail: OUTPATIENT SURGERY
Discharge: HOME | End: 2019-09-12
Attending: INTERNAL MEDICINE
Payer: COMMERCIAL

## 2019-09-12 VITALS
DIASTOLIC BLOOD PRESSURE: 79 MMHG | OXYGEN SATURATION: 93 % | RESPIRATION RATE: 20 BRPM | BODY MASS INDEX: 36.15 KG/M2 | WEIGHT: 274 LBS | HEART RATE: 97 BPM | SYSTOLIC BLOOD PRESSURE: 134 MMHG | TEMPERATURE: 97.9 F

## 2019-09-12 DIAGNOSIS — D13.2 ADENOMATOUS DUODENAL POLYP: ICD-10-CM

## 2019-09-12 PROCEDURE — 43236 UPPR GI SCOPE W/SUBMUC INJ: CPT | Performed by: INTERNAL MEDICINE

## 2019-09-12 PROCEDURE — 43251 EGD REMOVE LESION SNARE: CPT | Performed by: INTERNAL MEDICINE

## 2019-09-12 PROCEDURE — 88305 TISSUE EXAM BY PATHOLOGIST: CPT | Performed by: PATHOLOGY

## 2019-09-12 RX ORDER — PROPOFOL 10 MG/ML
INJECTION, EMULSION INTRAVENOUS CONTINUOUS PRN
Status: DISCONTINUED | OUTPATIENT
Start: 2019-09-12 | End: 2019-09-12 | Stop reason: SURG

## 2019-09-12 RX ORDER — PROPOFOL 10 MG/ML
INJECTION, EMULSION INTRAVENOUS AS NEEDED
Status: DISCONTINUED | OUTPATIENT
Start: 2019-09-12 | End: 2019-09-12 | Stop reason: SURG

## 2019-09-12 RX ORDER — SODIUM CHLORIDE 9 MG/ML
100 INJECTION, SOLUTION INTRAVENOUS CONTINUOUS
Status: DISCONTINUED | OUTPATIENT
Start: 2019-09-12 | End: 2019-09-16 | Stop reason: HOSPADM

## 2019-09-12 RX ORDER — SODIUM CHLORIDE 9 MG/ML
INJECTION, SOLUTION INTRAVENOUS CONTINUOUS PRN
Status: DISCONTINUED | OUTPATIENT
Start: 2019-09-12 | End: 2019-09-12 | Stop reason: SURG

## 2019-09-12 RX ORDER — FENTANYL CITRATE 50 UG/ML
INJECTION, SOLUTION INTRAMUSCULAR; INTRAVENOUS AS NEEDED
Status: DISCONTINUED | OUTPATIENT
Start: 2019-09-12 | End: 2019-09-12 | Stop reason: SURG

## 2019-09-12 RX ORDER — ALBUTEROL SULFATE 90 UG/1
AEROSOL, METERED RESPIRATORY (INHALATION) AS NEEDED
Status: DISCONTINUED | OUTPATIENT
Start: 2019-09-12 | End: 2019-09-12 | Stop reason: SURG

## 2019-09-12 RX ORDER — KETAMINE HYDROCHLORIDE 50 MG/ML
INJECTION, SOLUTION, CONCENTRATE INTRAMUSCULAR; INTRAVENOUS AS NEEDED
Status: DISCONTINUED | OUTPATIENT
Start: 2019-09-12 | End: 2019-09-12 | Stop reason: SURG

## 2019-09-12 RX ORDER — GLYCOPYRROLATE 0.2 MG/ML
INJECTION INTRAMUSCULAR; INTRAVENOUS AS NEEDED
Status: DISCONTINUED | OUTPATIENT
Start: 2019-09-12 | End: 2019-09-12 | Stop reason: SURG

## 2019-09-12 RX ADMIN — SODIUM CHLORIDE 100 ML/HR: 0.9 INJECTION, SOLUTION INTRAVENOUS at 14:18

## 2019-09-12 RX ADMIN — GLUCAGON HYDROCHLORIDE 0.5 MG: KIT at 15:44

## 2019-09-12 RX ADMIN — FENTANYL CITRATE 25 MCG: 50 INJECTION, SOLUTION INTRAMUSCULAR; INTRAVENOUS at 16:01

## 2019-09-12 RX ADMIN — SODIUM CHLORIDE: 0.9 INJECTION, SOLUTION INTRAVENOUS at 15:55

## 2019-09-12 RX ADMIN — KETAMINE HYDROCHLORIDE 20 MG: 50 INJECTION, SOLUTION INTRAMUSCULAR; INTRAVENOUS at 16:13

## 2019-09-12 RX ADMIN — PROPOFOL 150 MCG/KG/MIN: 10 INJECTION, EMULSION INTRAVENOUS at 15:38

## 2019-09-12 RX ADMIN — PROPOFOL 100 MG: 10 INJECTION, EMULSION INTRAVENOUS at 15:41

## 2019-09-12 RX ADMIN — GLYCOPYRROLATE 0.2 MG: 0.2 INJECTION, SOLUTION INTRAMUSCULAR; INTRAVENOUS at 15:37

## 2019-09-12 RX ADMIN — GLUCAGON HYDROCHLORIDE 0.5 MG: KIT at 16:06

## 2019-09-12 RX ADMIN — ALBUTEROL SULFATE 2 PUFF: 90 AEROSOL, METERED RESPIRATORY (INHALATION) at 15:32

## 2019-09-12 RX ADMIN — KETAMINE HYDROCHLORIDE 30 MG: 50 INJECTION, SOLUTION INTRAMUSCULAR; INTRAVENOUS at 15:37

## 2019-09-12 RX ADMIN — FENTANYL CITRATE 50 MCG: 50 INJECTION, SOLUTION INTRAMUSCULAR; INTRAVENOUS at 16:13

## 2019-09-12 RX ADMIN — SODIUM CHLORIDE: 0.9 INJECTION, SOLUTION INTRAVENOUS at 15:30

## 2019-09-12 RX ADMIN — FENTANYL CITRATE 25 MCG: 50 INJECTION, SOLUTION INTRAMUSCULAR; INTRAVENOUS at 15:53

## 2019-09-12 RX ADMIN — PROPOFOL 100 MG: 10 INJECTION, EMULSION INTRAVENOUS at 15:37

## 2019-09-12 NOTE — ANESTHESIA PREPROCEDURE EVALUATION
Review of Systems/Medical History  Patient summary reviewed  Chart reviewed  No history of anesthetic complications     Cardiovascular  EKG reviewed, Exercise tolerance (METS): >4, Exercise comment: Walks regularly at the gym for ~1 hr Pacemaker/AICD (AICD), Hyperlipidemia, Hypertension controlled, Valvular heart disease (mild-moderate MR) , mitral regurgitation, Past MI (~2013) > 6 months, CAD , CHF (EF ~25%) compensated CHF, Aortic disease (thoracoabdominal aortic aneurysm with chronic dissection),    Pulmonary  Smoker cigarette smoker  , Tobacco cessation counseling given ,        GI/Hepatic      Comment: Duodenal adenoma     Negative  ROS        Endo/Other  Diabetes type 2 Oral agent,   Obesity (BMI 35 2)  morbid obesity   GYN       Hematology  Negative hematology ROS      Musculoskeletal  Negative musculoskeletal ROS        Neurology  Negative neurology ROS      Psychology   Negative psychology ROS              Physical Exam    Airway    Mallampati score: II  TM Distance: >3 FB  Neck ROM: full     Dental   Comment: Multiple pre-existing missing teeth,     Cardiovascular  Rhythm: regular, Rate: normal, Cardiovascular exam normal    Pulmonary      Other Findings    No recent labs    TTE 2014 SUMMARY   LEFT VENTRICLE:   The ventricle was markedly dilated  Systolic function was severely reduced  Ejection fraction was estimated to be   22 %  Features were consistent with a   pseudonormal left ventricular filling pattern, with concomitant abnormal   No evidence of apical thrombus  RIGHT VENTRICLE:   The size was normal    Systolic function was normal    Wall thickness was mildly to moderately increased  LEFT ATRIUM:   The atrium was moderately dilated (calculated LA volume 90 ml, DEDRICK 37 ml/m2)  MITRAL VALVE:   There was mild to moderate regurgitation  Regurgitation grade was 1-2+ on a scale of 0 to 4+     AORTA:   The root exhibited mild dilatation ( maximum AP diameter 40 mm) and mild fibrocalcific change  There was mild dilatation of the abdominal aorta (maximum AP diameter 43 mm)  Anesthesia Plan  ASA Score- 4     Anesthesia Type- IV sedation with anesthesia with ASA Monitors  Additional Monitors:   Airway Plan:         Plan Factors- Patient instructed to abstain from smoking on day of procedure  Patient smoked on day of surgery  Induction- intravenous  Postoperative Plan-     Informed Consent- Anesthetic plan and risks discussed with patient  I personally reviewed this patient with the CRNA  Discussed and agreed on the Anesthesia Plan with the CRNA           NPO verified  NKDA  Patient takes metoprolol XL and last took it yesterday morning  Will give IV BB intraop as hemodynamics permit  AICD was interrogated pre-op today (12/26/18) by [employer] representative  Battery life is good, patient is not pacer dependent, and patient has never been defibrillated by his device  Will place magnet on AICD as given that GI will be using cautery for the procedure  As per [employer] rep, no need for interrogation post-op after magnet removal     Plan:  IV sedation/MAC; GA as backup    Risks and benefits discussed with patient  Questions answered  Patient consented

## 2019-09-12 NOTE — ANESTHESIA POSTPROCEDURE EVALUATION
Post-Op Assessment Note    CV Status:  Stable  Pain Score: 0    Pain management: adequate     Mental Status:  Alert and awake   Hydration Status:  Euvolemic   PONV Controlled:  Controlled   Airway Patency:  Patent   Post Op Vitals Reviewed: Yes      Staff: CRNA, Anesthesiologist           /80 (09/12/19 1635)    Temp      Pulse (!) 122 (09/12/19 1635)   Resp 18 (09/12/19 1635)    SpO2 97 % (09/12/19 1635)

## 2019-09-12 NOTE — H&P
History and Physical -  [facility]  Amberly Quintana 60 y o  male MRN: [id number]    HPI: Amberly Quintana is a 60y o  year old male who presents with h/o duodenal polyp with partial removal in 12/18  Now for repeat  Review of Systems    Historical Information   Past Medical History:   Diagnosis Date    Aortic disease ([place] [address] )     CAD (coronary artery disease)     [facility]     Diabetes mellitus ([place] [address] )     Hyperlipidemia     Hypertension     ICD (implantable 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     Mitral regurgitation     Myocardial infarction ([place] [address] )     Valvular heart disease      Past Surgical History:   Procedure Laterality Date    COLONOSCOPY      ESOPHAGOGASTRODUODENOSCOPY      INSERT / REPLACE / REMOVE PACEMAKER      [place] EDG US EXAM SURGICAL ALTER STOM DUODENUM/JEJUNUM N/A 12/26/2018    Procedure: RADIAL ENDOSCOPIC U/S WITH POLYPECTOMY;  Surgeon: Sheryle Congress, MD;  [facility]; Service: Gastroenterology    TONSILLECTOMY       Social History   Social History     Substance and Sexual Activity   Alcohol Use Yes    Comment: 3X/WEEK 6 PACK BEER     Social History     Substance and Sexual Activity   Drug Use No     Social History     Tobacco Use   Smoking Status Current Some Day Smoker   Smokeless Tobacco Never Used   Tobacco Comment    1 OR 2 CIGS DAILY     No family history on file  Meds/Allergies       (Not in a hospital admission)    No Known Allergies    Objective     There were no vitals taken for this visit  PHYSICAL EXAM    Gen: NAD  CV: RRR  CHEST: Clear  ABD: soft, NT/ND  EXT: no edema  Neuro: AAO      ASSESSMENT/PLAN:  This is a 60y o  year old male here for EGD for h/o duodenal polyp  PLAN:   Procedure: EGD

## 2019-09-19 ENCOUNTER — TELEPHONE (OUTPATIENT)
Dept: GASTROENTEROLOGY | Facility: CLINIC | Age: 64
End: 2019-09-19

## 2019-09-19 NOTE — TELEPHONE ENCOUNTER
LMOM for patient to call office to schedule EGD   Was going to offer 12/12/19 with Dr Isabelle Colmenares at [facility]

## 2019-09-19 NOTE — TELEPHONE ENCOUNTER
----- Message from William Hutchins sent at 9/19/2019 11:52 AM EDT -----  Please assist in scheduling repeat, He also stated he can only do the morning  Thank you  ----- Message -----  From: Luis M Muñoz MD  Sent: 9/19/2019   9:00 AM EDT  To: [facility]    Please call patient :  These biopsies were benign  I would however recommend repeating the endoscopy in 3 months  Please place reminder for the endoscopy

## 2023-12-05 NOTE — PROGRESS NOTES
Electrophysiology-Cardiology (EP)   Jamie Bowman 79 y.o. male MRN: [id number]  Unit/Bed#:  Encounter: [id number]        PLAN:    ICD at [facility]  -will schedule for device generator change  -May benefit from atrial lead addition given his bradycardia on presentation, symptoms of fatigue, and need for chronic beta-blocker use  -Will obtain venogram at time of device generator change, if vein is patent, will add atrial lead  -[employer] device    Ischemic cardiomyopathy, last echocardiogram in our system 2014 EF 20 to 25%  -Patient will obtain TTE through [place]    Fatigue  -difficult to say cause.   -says he sleeps 12 hours a day  -is waking up during the night choking/gasping for air  -does not wake up well rested  -given habitus, likely sleep apnea  -will review with his PCP regarding sleep study    High suspicion for DORIS  -Patient willing to explore possible sleep study with PCP at the [place]      Sinus bradycardia - symptomatic  -HR 49 bpm in office  -Reduced metoprolol succinate to 50mg daily - needs max tolerated dose due to history of ischemic cardiomyopathy   -Will obtain TTE through [place] - patient will have his [place] provider order this  -Patient may benefit from addition of atrial lead at the time of his generator change          Referring Physian: No att. providers found    Chief Complain/Reason for Referal:   Bridget Campos is a 79 y.o. male with a history of ischemic cardiomyopathy with LV dysfunction, anterior STEMI with VF arrest in 1/2013, HTN, dyslipidemia, and ascending aortic aneurysm who presents today for evaluation as his device has reached ÓSCAR. Today he notes he has some shortness of breath/dyspnea on exertion and fatigue. Feels as if he has gained some weight and that is the main  of his symptoms. He states he has to sit down after working for 15 minutes which is a bit unusual, although is also a bit chronic.   He gets out of breath with going up stairs, but is able to do so if he takes his time.  It does sound as if he has sleep apnea as he does wake himself up choking and gasping for air, and does not feel well rested in the morning. He states he is sleeping for 12 hours a night, however, it is not likely that this is quality sleep. He has quit smoking and notes that his shortness of breath improved some with this. We discussed further workup for dyspnea on exertion including echocardiogram given his ischemic history, but the patient noted he would like to have this done through the [place] system. May consider the addition of an atrial lead at the time of his generator change as he may benefit from a faster heart rate. He has first-degree AV block with nonspecific interventricular conduction delay, but his QRS width is 126 ms. Patient Active Problem List    Diagnosis Date Noted    Dyspnea on exertion 12/06/2023    Fatigue 12/06/2023    Adenomatous duodenal polyp 11/02/2018             Past Medical History:   Diagnosis Date    Aortic disease ([address])     CAD (coronary artery disease)     CHF [facility]congestive heart failure) ([facility])     Diabetes mellitus ([address])     Hyperlipidemia     Hypertension     ICD (implantable 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     Mitral regurgitation     Myocardial infarction [facility])     Valvular heart disease        (Not in a hospital admission)      Scheduled Meds:  Continuous Infusions:No current facility-administered medications for this visit. PRN Meds:. No Known Allergies  I reviewed the Home Medication list in the chart. History reviewed. No pertinent family history.     Social History     Socioeconomic History    Marital status: Single     Spouse name: Not on file    Number of children: Not on file    Years of education: Not on file    Highest education level: Not on file   Occupational History    Not on file   Tobacco Use    Smoking status: Some Days    Smokeless tobacco: Never    Tobacco comments:     1 OR 2 CIGS DAILY   Substance and Sexual Activity    Alcohol use: Yes     Comment: 3X/WEEK 6 PACK BEER    Drug use: No    Sexual activity: Not on file   Other Topics Concern    Not on file   Social History Narrative    Not on file     Social Determinants of Health     Financial Resource Strain: Not on file   Food Insecurity: Not on file   Transportation Needs: Not on file   Physical Activity: Not on file   Stress: Not on file   Social Connections: Not on file   Intimate Partner Violence: Not on file   Housing Stability: Not on file       Review of Systems -12 Point ROS reviewed and are negative or noted in chart except for Pertinent Positives Pertaining to Cardiovascular and Respiratory in HPI above. Vitals:    12/06/23 0945   BP: 104/70   Pulse: (!) 49     Vitals:    12/06/23 0945   Weight: 132 kg (291 lb 1.6 oz)   [unfilled]      GEN: Now acute distress, Alert and oriented, well appearing  HEENT: Normocephalic, atraumatic. Neck is thick  CARDIOVASCULAR: RRR, distant heart sounds, no murmur, rub, gallops S1,S2  LUNGS: Clear To auscultation bilaterally, normal effort, no rales, rhonchi, crackles  ABDOMEN: Protuberant, soft, nondistended, nontender, without obvious organomegaly or ascites  EXTREMITIES/VASCULAR: No edema. warm an well perfused. PSYCH: Normal Affect, no overt suicidal ideation, linear speech pattern without evidence of psychosis. NEURO: Grossly intact, moving all extremiteis equal, face symmetric, alert and responsive, no obvious focal defecits  HEME: No bleeding, bruising, petechia, purpura  SKIN: No significant rashes, warm, no diaphoresis or pallor. Lab Results:     CBC with diff:       Invalid input(s): "TOTALCELLSCOUNTED", "SEGS%", "GRANS%", "LYMPHS%", "EOS%", "BASO%", "ABNEUT", "ABGRANS", "ABLYMPHS", "ABMOMOS", "ABEOS", "ABBASO"      CMP:      Invalid input(s): "ALBUMIN"      BMP:      Invalid input(s): "LABGLOM"    BNP:   Results Reviewed       None          No results for input(s): "BNP" in the last 72 hours.              Magnesium: Coags:       TSH:       Lipid Profile:         Cardiac testing:         EKG/TELE: performed 12/6/23  Sinus bradycardia with first degree AV block  Nonspecific intraventricular conduction delay      I have personally reviewed the EKG, Echocardiogram, CXR and Telemetry images directly and the above is my interpretation.

## 2023-12-06 ENCOUNTER — OFFICE VISIT (OUTPATIENT)
Dept: CARDIOLOGY CLINIC | Facility: CLINIC | Age: 68
End: 2023-12-06
Payer: COMMERCIAL

## 2023-12-06 ENCOUNTER — IN-CLINIC DEVICE VISIT (OUTPATIENT)
Dept: CARDIOLOGY CLINIC | Facility: CLINIC | Age: 68
End: 2023-12-06

## 2023-12-06 VITALS
BODY MASS INDEX: 38.58 KG/M2 | SYSTOLIC BLOOD PRESSURE: 104 MMHG | DIASTOLIC BLOOD PRESSURE: 70 MMHG | HEART RATE: 49 BPM | HEIGHT: 73 IN | WEIGHT: 291.1 LBS

## 2023-12-06 DIAGNOSIS — Z45.02 ELECTIVE REPLACEMENT INDICATED FOR IMPLANTABLE CARDIOVERTER-DEFIBRILLATOR (ICD): Primary | ICD-10-CM

## 2023-12-06 DIAGNOSIS — Z95.810 PRESENCE OF IMPLANTABLE CARDIOVERTER-DEFIBRILLATOR (ICD): Primary | ICD-10-CM

## 2023-12-06 PROBLEM — R06.09 DYSPNEA ON EXERTION: Status: ACTIVE | Noted: 2023-12-06

## 2023-12-06 PROBLEM — R53.83 FATIGUE: Status: ACTIVE | Noted: 2023-12-06

## 2023-12-06 PROCEDURE — 93000 ELECTROCARDIOGRAM COMPLETE: CPT | Performed by: STUDENT IN AN ORGANIZED HEALTH CARE EDUCATION/TRAINING PROGRAM

## 2023-12-06 PROCEDURE — 99214 OFFICE O/P EST MOD 30 MIN: CPT | Performed by: STUDENT IN AN ORGANIZED HEALTH CARE EDUCATION/TRAINING PROGRAM

## 2023-12-06 PROCEDURE — RECHECK: Performed by: INTERNAL MEDICINE

## 2023-12-06 NOTE — PATIENT INSTRUCTIONS
Please don't forget to discuss your shortness of breath and fatigue with your cardiologist at the [place]. I wanted to order an echocardiogram, but you noted that you would like that to be done at the [place].

## 2023-12-08 ENCOUNTER — TELEPHONE (OUTPATIENT)
Dept: CARDIOLOGY CLINIC | Facility: CLINIC | Age: 68
End: 2023-12-08

## 2023-12-08 NOTE — TELEPHONE ENCOUNTER
I called patient to schedule Single [place] ICD Gen Change w/poss Atrial Lead  but patient wants to hold off until he sees his [facility] doctor next week on Tuesday and schedules the ECHO. Patient will call me back next week. Patient states Geoff Frazier wanted to see the results of ECHO prior to scheduling the Gen Change. Forwarding to  as FYI.      Thanks,  Erika "Bea Chavez" [employer]

## 2023-12-08 NOTE — TELEPHONE ENCOUNTER
Progress Notes by Maya Disla MD 12/6/2023    PLAN:     ICD at [facility]  -will schedule for device generator change  -May benefit from atrial lead addition given his bradycardia on presentation, symptoms of fatigue, and need for chronic beta-blocker use  -Will obtain venogram at time of device generator change, if vein is patent, will add atrial lead  -[employer] device     Ischemic cardiomyopathy, last echocardiogram in our system 2014 EF 20 to 25%  -[facility] [place] [facility]     Fatigue  -difficult to say cause.   -says he sleeps 12 hours a day  -is waking up during the night choking/gasping for air  -does not wake up well rested  -given habitus, likely sleep apnea  -will review with his PCP regarding sleep study     High suspicion for DORIS  -Patient willing to explore possible sleep study with PCP at the [place]        Sinus bradycardia - symptomatic  -HR 49 bpm in office  -Reduced metoprolol succinate to 50mg daily - needs max tolerated dose due to history of ischemic cardiomyopathy   -Will obtain TTE through [place] - patient will have his [place] provider order this  -Patient may benefit from addition of atrial lead at the time of his generator change

## 2023-12-12 DIAGNOSIS — Z45.02 ELECTIVE REPLACEMENT INDICATED FOR IMPLANTABLE CARDIOVERTER-DEFIBRILLATOR (ICD): Primary | ICD-10-CM

## 2023-12-12 NOTE — TELEPHONE ENCOUNTER
Placed order for Left Upper Extremity Venogram.      Called and informed patient that he'll be getting a call to schedule this test. Patient verbalized understanding.        Thanks,  Erika Tyler (Clydia Ellison)

## 2023-12-12 NOTE — TELEPHONE ENCOUNTER
Can we get him scheduled for a left upper extremity venogram? If his vein is patent, may just proceed with scheduling.       Thanks,    vinnie

## 2023-12-12 NOTE — TELEPHONE ENCOUNTER
Received call from Pam Nolen, surgery coordinator at [facility]. Lorne Snellen called to relay a message from PCP [address]. It reads as follows:     [facility] and that is who needs to order ECHO. If it is a cardiology request then please have the notes obtained and sent over, otherwise patient needs cardiologist visit at the [place]. If SOB significantly has to go to ER. ECHO will not solve his concerns.      Dr.Charlene Denny's office ph: [phone]    Fwd to /[facility].    Thanks,  Erika Camp

## 2023-12-13 DIAGNOSIS — Z95.9 CARDIAC DEVICE IN SITU: Primary | ICD-10-CM

## 2023-12-22 ENCOUNTER — HOSPITAL ENCOUNTER (OUTPATIENT)
Dept: NON INVASIVE DIAGNOSTICS | Facility: HOSPITAL | Age: 68
Discharge: HOME | End: 2023-12-22
Attending: RADIOLOGY
Payer: MEDICARE

## 2023-12-22 DIAGNOSIS — Z95.9 CARDIAC DEVICE IN SITU: ICD-10-CM

## 2023-12-22 PROCEDURE — NC001 PR NO CHARGE: Performed by: RADIOLOGY

## 2023-12-22 PROCEDURE — 75820 VEIN X-RAY ARM/LEG: CPT

## 2023-12-22 RX ORDER — IODIXANOL 320 MG/ML
50 INJECTION, SOLUTION INTRAVASCULAR
Status: COMPLETED | OUTPATIENT
Start: 2023-12-22 | End: 2023-12-22

## 2023-12-22 RX ADMIN — IODIXANOL 20 ML: 320 INJECTION, SOLUTION INTRAVASCULAR at 08:25

## 2023-12-22 NOTE — H&P
Interventional Radiology  History and Physical 12/22/2023     Kvng Bynum   1955   [id number]    Assessment/Plan:  68 year old male with history of ischemic cardiomyopathy will be undergoing ICD upgrade and presents for left upper extremity venogram to evaluate for venous patency.    Problem List Items Addressed This Visit    None  Visit Diagnoses       Cardiac device in situ        Relevant Orders    IR upper extremity venogram- Diagnostic               Subjective:     Patient ID: Kvng Bynum is a 68 y.o. male.    History of Present Illness  Patient with history of ischemic cardiomyopathy will be undergoing ICD upgrade and presents for left upper extremity venogram to evaluate for venous patency.    Review of Systems      Past Medical History:   Diagnosis Date    Aortic disease (HCC)     CAD (coronary artery disease)     CHF (congestive heart failure) (HCC)     Diabetes mellitus (HCC)     Hyperlipidemia     Hypertension     ICD (implantable 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     Mitral regurgitation     Myocardial infarction (HCC)     Valvular heart disease         Past Surgical History:   Procedure Laterality Date    COLONOSCOPY      ESOPHAGOGASTRODUODENOSCOPY      INSERT / REPLACE / REMOVE PACEMAKER      MD EDG US EXAM SURGICAL ALTER STOM DUODENUM/JEJUNUM N/A 12/26/2018    Procedure: RADIAL ENDOSCOPIC U/S WITH POLYPECTOMY;  Surgeon: Bob Andrea MD;  Location: [facility];  Service: Gastroenterology    TONSILLECTOMY          Social History     Tobacco Use   Smoking Status Some Days   Smokeless Tobacco Never   Tobacco Comments    1 OR 2 CIGS DAILY        Social History     Substance and Sexual Activity   Alcohol Use Yes    Comment: 3X/WEEK 6 PACK BEER        Social History     Substance and Sexual Activity   Drug Use No        No Known Allergies    Current Outpatient Medications   Medication Sig Dispense Refill    amLODIPine (NORVASC) 5 mg tablet Take 1 tablet by mouth daily      aspirin 81 MG tablet  "Take 1 tablet by mouth daily      atorvastatin (LIPITOR) 80 mg tablet Take 1 tablet by mouth daily      Cholecalciferol (VITAMIN D3) 1000 units CAPS Take by mouth      cyanocobalamin 100 MCG tablet Take 100 mcg by mouth daily      eplerenone (INSPRA) 25 mg tablet Take 25 mg by mouth daily      ERGOCALCIFEROL PO Take by mouth (Patient not taking: Reported on 12/6/2023)      lisinopril (ZESTRIL) 40 mg tablet Take 1 tablet by mouth daily      metFORMIN (GLUCOPHAGE) 500 mg tablet Take 1 tablet by mouth 2 (two) times a day      metoprolol succinate (TOPROL-XL) 100 mg 24 hr tablet Take 0.5 tablets by mouth      nicotine polacrilex (COMMIT) 4 MG lozenge Apply 1 lozenge to the mouth or throat as needed      Omega-3 Fatty Acids (FISH OIL) 1200 MG CAPS Take by mouth (Patient not taking: Reported on 12/6/2023)      rosuvastatin (CRESTOR) 40 MG tablet Take 40 mg by mouth daily       No current facility-administered medications for this encounter.          Objective:    There were no vitals filed for this visit.     Physical Exam  Constitutional:       Appearance: Normal appearance.   Pulmonary:      Effort: Pulmonary effort is normal.           No results found for: \"BNP\"   Lab Results   Component Value Date    WBC 6.61 01/27/2014    HGB 14.8 01/27/2014    HCT 44.7 01/27/2014     (H) 01/27/2014     01/27/2014     No results found for: \"INR\", \"PROTIME\"  No results found for: \"PTT\"      I have personally reviewed pertinent imaging and laboratory results.     Code Status: No Order  Advance Directive and Living Will:      Power of :    POLST:      This text is generated with voice recognition software. There may be translation, syntax,  or grammatical errors. If you have any questions, please contact the dictating provider.   "

## 2023-12-22 NOTE — BRIEF OP NOTE (RAD/CATH)
INTERVENTIONAL RADIOLOGY PROCEDURE NOTE    Date: 12/22/2023    Procedure:   Procedure Summary       Date: 12/22/23 Room / Location: [facility]    Anesthesia Start:  Anesthesia Stop:     Procedure: IR UPPER EXTREMITY VENOGRAM- DIAGNOSTIC Diagnosis:       Cardiac device in situ      (MILENA PARKS)    Scheduled Providers:  Responsible Provider:     Anesthesia Type: Not recorded ASA Status: Not recorded            Preoperative diagnosis:   1. Cardiac device in situ         Postoperative diagnosis: Same.    Surgeon: Vernon Armstrong MD     Assistant: None. No qualified resident was available.    Blood loss: None    Specimens: None     Findings: Left subclavian, brachiocephalic veins and SVC were patent.    Complications: None immediate.    Anesthesia: none

## 2024-01-08 ENCOUNTER — PREP FOR PROCEDURE (OUTPATIENT)
Dept: CARDIOLOGY CLINIC | Facility: CLINIC | Age: 69
End: 2024-01-08

## 2024-01-08 DIAGNOSIS — Z95.810 PRESENCE OF IMPLANTABLE CARDIOVERTER-DEFIBRILLATOR (ICD): Primary | ICD-10-CM

## 2024-01-10 ENCOUNTER — APPOINTMENT (OUTPATIENT)
Dept: LAB | Facility: CLINIC | Age: 69
End: 2024-01-10
Payer: COMMERCIAL

## 2024-01-10 LAB
ALBUMIN SERPL BCP-MCNC: 4.1 G/DL (ref 3.5–5)
ALP SERPL-CCNC: 55 U/L (ref 34–104)
ALT SERPL W P-5'-P-CCNC: 37 U/L (ref 7–52)
ANION GAP SERPL CALCULATED.3IONS-SCNC: 7 MMOL/L
AST SERPL W P-5'-P-CCNC: 27 U/L (ref 13–39)
BASOPHILS # BLD AUTO: 0.05 THOUSANDS/ÂΜL (ref 0–0.1)
BASOPHILS NFR BLD AUTO: 1 % (ref 0–1)
BILIRUB SERPL-MCNC: 0.95 MG/DL (ref 0.2–1)
BUN SERPL-MCNC: 15 MG/DL (ref 5–25)
CALCIUM SERPL-MCNC: 9.2 MG/DL (ref 8.4–10.2)
CHLORIDE SERPL-SCNC: 103 MMOL/L (ref 96–108)
CO2 SERPL-SCNC: 27 MMOL/L (ref 21–32)
CREAT SERPL-MCNC: 0.87 MG/DL (ref 0.6–1.3)
EOSINOPHIL # BLD AUTO: 0.22 THOUSAND/ÂΜL (ref 0–0.61)
EOSINOPHIL NFR BLD AUTO: 3 % (ref 0–6)
ERYTHROCYTE [DISTWIDTH] IN BLOOD BY AUTOMATED COUNT: 13.5 % (ref 11.6–15.1)
GFR SERPL CREATININE-BSD FRML MDRD: 89 ML/MIN/1.73SQ M
GLUCOSE SERPL-MCNC: 159 MG/DL (ref 65–140)
HCT VFR BLD AUTO: 48 % (ref 36.5–49.3)
HGB BLD-MCNC: 16 G/DL (ref 12–17)
IMM GRANULOCYTES # BLD AUTO: 0.02 THOUSAND/UL (ref 0–0.2)
IMM GRANULOCYTES NFR BLD AUTO: 0 % (ref 0–2)
LYMPHOCYTES # BLD AUTO: 2.89 THOUSANDS/ÂΜL (ref 0.6–4.47)
LYMPHOCYTES NFR BLD AUTO: 42 % (ref 14–44)
MCH RBC QN AUTO: 33.1 PG (ref 26.8–34.3)
MCHC RBC AUTO-ENTMCNC: 33.3 G/DL (ref 31.4–37.4)
MCV RBC AUTO: 99 FL (ref 82–98)
MONOCYTES # BLD AUTO: 0.53 THOUSAND/ÂΜL (ref 0.17–1.22)
MONOCYTES NFR BLD AUTO: 8 % (ref 4–12)
NEUTROPHILS # BLD AUTO: 3.12 THOUSANDS/ÂΜL (ref 1.85–7.62)
NEUTS SEG NFR BLD AUTO: 46 % (ref 43–75)
NRBC BLD AUTO-RTO: 0 /100 WBCS
PLATELET # BLD AUTO: 158 THOUSANDS/UL (ref 149–390)
PMV BLD AUTO: 10.2 FL (ref 8.9–12.7)
POTASSIUM SERPL-SCNC: 4.3 MMOL/L (ref 3.5–5.3)
PROT SERPL-MCNC: 7.1 G/DL (ref 6.4–8.4)
RBC # BLD AUTO: 4.83 MILLION/UL (ref 3.88–5.62)
SODIUM SERPL-SCNC: 137 MMOL/L (ref 135–147)
WBC # BLD AUTO: 6.83 THOUSAND/UL (ref 4.31–10.16)

## 2024-01-18 ENCOUNTER — ANESTHESIA EVENT (OUTPATIENT)
Dept: NON INVASIVE DIAGNOSTICS | Facility: HOSPITAL | Age: 69
End: 2024-01-18
Payer: COMMERCIAL

## 2024-01-18 PROBLEM — E11.9 DIABETES MELLITUS (HCC): Status: ACTIVE | Noted: 2024-01-18

## 2024-01-18 PROBLEM — I25.10 CAD (CORONARY ARTERY DISEASE): Status: ACTIVE | Noted: 2024-01-18

## 2024-01-18 PROBLEM — I10 HYPERTENSION: Status: ACTIVE | Noted: 2024-01-18

## 2024-01-18 PROBLEM — I50.9 CHF (CONGESTIVE HEART FAILURE) (HCC): Status: ACTIVE | Noted: 2024-01-18

## 2024-01-18 PROBLEM — Z95.810 ICD (IMPLANTABLE CARDIOVERTER-DEFIBRILLATOR) IN PLACE: Status: ACTIVE | Noted: 2024-01-18

## 2024-01-18 PROBLEM — I21.9 MYOCARDIAL INFARCTION (HCC): Status: ACTIVE | Noted: 2024-01-18

## 2024-01-18 PROBLEM — E78.5 HYPERLIPIDEMIA: Status: ACTIVE | Noted: 2024-01-18

## 2024-01-18 NOTE — ANESTHESIA PREPROCEDURE EVALUATION
Procedure:  Cardiac icd generator change and DEVICE UPGRADE TO DC ICD (Chest)    Relevant Problems   ANESTHESIA (within normal limits)      CARDIO   (+) CAD (coronary artery disease)   (+) CHF (congestive heart failure) (HCC)   (+) Dyspnea on exertion   (+) Hyperlipidemia   (+) Hypertension   (+) Myocardial infarction (HCC)      ENDO (within normal limits)      GI/HEPATIC (within normal limits)      /RENAL (within normal limits)      NEURO/PSYCH (within normal limits)      PULMONARY   (+) Dyspnea on exertion      Endocrine   (+) Diabetes mellitus (HCC)      Other   (+) ICD (implantable 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      Cardiac Device Check 12/6/2023:  MDT SINGLE ICD/ NOT MRI CONDITIONAL   DEVICE INTERROGATED IN THE [facility] OFFICE. RRT REACHED ON 10/8/23; SEEING DR. RAZA TODAY.  2.6% ALL AVAILABLE LEAD PARAMETERS WITHIN NORMAL LIMITS. NO SIGNIFICANT HIGH RATE EPISODES.     TTE 2014:  LEFT VENTRICLE:   The ventricle was markedly dilated.   Systolic function was severely reduced. Ejection fraction was estimated to be   22 % (Ejection fraction by fractional shortening 26%, EF by Teichholz 27%, by   Cardenas method 22%, EF by disc summation method 25%, myocardial performance   index 0.81 (normal 0.39+/_ 0.05), +dP/dt (LV contractality) 696mmHg/s (normal   =/> 1200), LV peak systolic longitudinal strain 7.1%).   LV stroke volume 71 ml, CO 4 L/min, CI 1.65 L/min/m2).   There was akinesis of the apical anterior, basal-mid anteroseptal, basal-mid   inferoseptal, apical inferior, apical septal, and apical wall(s). Mass was   definitely increased. The changes were consistent with eccentric hypertrophy   (LV mass 306 gm, LVMI 124 r/m2, RWT 0.29). Features were consistent with a   pseudonormal left ventricular filling pattern, with concomitant abnormal   relaxation and increased filling pressure (grade 2 diastolic dysfunction).   No evidence of apical thrombus.     RIGHT VENTRICLE:   The size was normal.   Systolic  "function was normal.   Wall thickness was mildly to moderately increased.     LEFT ATRIUM:   The atrium was moderately dilated (calculated LA volume 90 ml, DEDRICK 37 ml/m2).     MITRAL VALVE:   There was mild to moderate regurgitation.   Regurgitation grade was 1-2+ on a scale of 0 to 4+.     AORTA:   The root exhibited mild dilatation ( maximum AP diameter 40 mm) and mild   fibrocalcific change.   There was mild dilatation of the abdominal aorta (maximum AP diameter 43 mm).     Nuclear Stress 2014:  -  Rest ECG: Sinus bradycardia. There was evidence of an old, anterior   myocardial infarction. -  Stress results: Duration of pharmacologic stress was   3 min. There was no chest pain during stress. -  ECG conclusions: The stress   ECG was equivocal for ischemia. Arrhythmia during stress: isolated premature   ventricular beats. -  Perfusion imaging: The left ventricle was severely   dilated. There was a large, severe, partially reversible myocardial perfusion   defect of the entire anteroapical wall. -  Gated SPECT: The calculated left   ventricular ejection fraction was 25 %. There was moderate global left   ventricular hypokinesis. There was severely reduced myocardial thickening and   motion of the apical wall and anterior wall of the left ventricle.   IMPRESSIONS: Markedly abnormal study.    Lab Results   Component Value Date    WBC 6.83 01/10/2024    HGB 16.0 01/10/2024    HCT 48.0 01/10/2024    MCV 99 (H) 01/10/2024     01/10/2024     Lab Results   Component Value Date    SODIUM 137 01/10/2024    K 4.3 01/10/2024     01/10/2024    CO2 27 01/10/2024    BUN 15 01/10/2024    CREATININE 0.87 01/10/2024    GLUC 159 (H) 01/10/2024    CALCIUM 9.2 01/10/2024     No results found for: \"INR\", \"PROTIME\"  Lab Results   Component Value Date    HGBA1C 5.8 (H) 01/27/2014          Physical Exam    Airway    Mallampati score: II         Dental        Cardiovascular  Cardiovascular exam normal    Pulmonary  Pulmonary " exam normal     Other Findings        Anesthesia Plan  ASA Score- 4     Anesthesia Type- IV sedation with anesthesia with ASA Monitors.         Additional Monitors:     Airway Plan:            Plan Factors-Exercise tolerance (METS): <4 METS.    Chart reviewed. EKG reviewed.  Existing labs reviewed. Patient summary reviewed.                  Induction- intravenous.    Postoperative Plan-     Informed Consent- Anesthetic plan and risks discussed with patient.  I personally reviewed this patient with the CRNA. Discussed and agreed on the Anesthesia Plan with the CRNA..

## 2024-01-18 NOTE — DISCHARGE INSTR - AVS FIRST PAGE
Please refer to post ICD implantation discharge instructions and restrictions below and your ICD booklet/temporary card.     Keep incision dry for one week. Do not use lotions/powders/creams on incision.     Leave outer bandage in place for 1 week - it is water proof, and as long as it is fully adhered to your skin you may shower with it.  If it appears as though the bandage is coming off and/or there is any communication to the area of device incision, please then keep the whole area dry for the remaining week.  After 1 week, please remove by pulling all edges away from the center of the bandage.    No overhead reaching/pushing/pulling/lifting greater than 5-10lbs with left arm for six weeks. Please call the office if you notice redness, swelling, bleeding, or drainage from incision or if you develop fevers    Implantable Cardioverter Defibrillator      If you have any questions, please call [phone] to speak with a nurse (8:30am-4pm, or [phone] after hours). For appointments, please call [phone].    WHAT YOU SHOULD KNOW:    An implantable cardioverter defibrillator (ICD) is a small device that monitors your heart rate and rhythm. It is commonly placed inside your upper chest region. It may be used if you have a ventricular arrhythmia, which is an irregular, dangerous rhythm from the bottom chamber of your heart. Some arrhythmias may cause your heart to suddenly stop beating. An ICD can give a shock to your heart to make it start beating again, or it can give pacing therapy (also known as pain-free therapy) to return your heart to normal rhythm. It is also a pacemaker, so it will pace your heart if needed to prevent it from beating too slowly.           AFTER YOU LEAVE:      Follow up with your primary healthcare provider or cardiologist as directed: You will need to follow-up to have your ICD checked and make sure you are not having problems. Write down your questions so you remember to ask them  during your visits.    Driving: you are ok to drive 48 hours after device is implanted     Self-care:   Ask about activity: Ask if you need to avoid moving your shoulder or arm, and for how long. Ask if you should avoid lifting heavy objects. Do not play any contact sports, such as football or wrestling, until your primary healthcare provider (PHP) or cardiologist tells you it is okay. You may only be able to drive for a certain amount of time per day, or during certain hours. Ask when you can return to work.   Care for your skin over the ICD: see instructions above     When you get a shock from your ICD: A shock may feel like someone has hit you, or you may feel a thump in the chest. If someone is touching you when you get a shock, they may feel a tingling feeling. The first time you feel a shock, it may scare you. Sit or lie down and stay calm. Ask someone to stay with you if possible. Please either call your cardiologist or report to an emergency room.    Safety instructions when you have an ICD:   Carry an ID card for the ICD: This card has important information about your ICD.    Stay away from magnets or machines with electric fields: This includes MRI machines. Avoid leaning into a car engine or doing welding. These things can interfere with how your ICD works.    Tell airport security you have an ICD: You may need to be searched by hand when you go through a security gate. The security gate or handheld wand could harm your ICD.    Keep an ICD diary: Record when you get a shock and what you were doing before you got the shock. Keep track of how you felt before and after the shock, as well as how many shocks you received. Write down the day and time of each shock. Bring the diary with you when you see your PHP or cardiologist.   Carry medical alert identification: Wear medical alert jewelry or carry a card that says you have an ICD. Ask your PHP or cardiologist where to get these items.    Contact your primary  healthcare provider or cardiologist if:   You have a fever.    You feel 1 or more shocks from your ICD and feel fine afterwards.   Your feet or ankles swell.   The area around your ICD is painful or tender after surgery.   The skin around your stitches or staples is red, swollen, or draining pus or fluid.    You have chills, a cough, and feel weak or achy.    You are sad or anxious and find it hard to do your usual activities.   You have questions or concerns about your condition or care.    Seek care immediately or call 911 if:   Your stitches or staples come apart.   Blood soaks through your bandage.   You feel more than 3 shocks in a row from your ICD.   You become weak, dizzy, or faint.   You feel your heart skip beats or beat very fast or slow, but you do not feel a shock from your ICD.   You have chest pain that does not go away with rest or medicine.        © 2014 [employer]. Information is for End User's use only and may not be sold, redistributed or otherwise used for commercial purposes. All illustrations and images included in CareNotes® are the copyrighted property of [employer] or [employer].  The above information is an  only. It is not intended as medical advice for individual conditions or treatments. Talk to your doctor, nurse or pharmacist before following any medical regimen to see if it is safe and effective for you.

## 2024-01-19 ENCOUNTER — ANESTHESIA (OUTPATIENT)
Dept: NON INVASIVE DIAGNOSTICS | Facility: HOSPITAL | Age: 69
End: 2024-01-19
Payer: COMMERCIAL

## 2024-01-19 ENCOUNTER — APPOINTMENT (OUTPATIENT)
Dept: RADIOLOGY | Facility: HOSPITAL | Age: 69
End: 2024-01-19
Payer: COMMERCIAL

## 2024-01-19 ENCOUNTER — HOSPITAL ENCOUNTER (OUTPATIENT)
Facility: HOSPITAL | Age: 69
Setting detail: OUTPATIENT SURGERY
Discharge: HOME | End: 2024-01-19
Attending: STUDENT IN AN ORGANIZED HEALTH CARE EDUCATION/TRAINING PROGRAM | Admitting: STUDENT IN AN ORGANIZED HEALTH CARE EDUCATION/TRAINING PROGRAM
Payer: COMMERCIAL

## 2024-01-19 VITALS
BODY MASS INDEX: 38.57 KG/M2 | HEIGHT: 73 IN | SYSTOLIC BLOOD PRESSURE: 151 MMHG | RESPIRATION RATE: 18 BRPM | DIASTOLIC BLOOD PRESSURE: 93 MMHG | TEMPERATURE: 97.8 F | OXYGEN SATURATION: 93 % | WEIGHT: 291.01 LBS | HEART RATE: 68 BPM

## 2024-01-19 DIAGNOSIS — Z95.810 PRESENCE OF IMPLANTABLE CARDIOVERTER-DEFIBRILLATOR (ICD): ICD-10-CM

## 2024-01-19 LAB
ANION GAP SERPL CALCULATED.3IONS-SCNC: 10 MMOL/L
ATRIAL RATE: 64 BPM
BUN SERPL-MCNC: 19 MG/DL (ref 5–25)
CALCIUM SERPL-MCNC: 9.3 MG/DL (ref 8.4–10.2)
CHLORIDE SERPL-SCNC: 102 MMOL/L (ref 96–108)
CO2 SERPL-SCNC: 28 MMOL/L (ref 21–32)
CREAT SERPL-MCNC: 0.84 MG/DL (ref 0.6–1.3)
ERYTHROCYTE [DISTWIDTH] IN BLOOD BY AUTOMATED COUNT: 13.6 % (ref 11.6–15.1)
GFR SERPL CREATININE-BSD FRML MDRD: 91 ML/MIN/1.73SQ M
GLUCOSE P FAST SERPL-MCNC: 166 MG/DL (ref 65–99)
GLUCOSE SERPL-MCNC: 166 MG/DL (ref 65–140)
HCT VFR BLD AUTO: 49 % (ref 36.5–49.3)
HGB BLD-MCNC: 16.8 G/DL (ref 12–17)
INR PPP: 1.02 (ref 0.84–1.19)
MAGNESIUM SERPL-MCNC: 1.8 MG/DL (ref 1.9–2.7)
MCH RBC QN AUTO: 33.6 PG (ref 26.8–34.3)
MCHC RBC AUTO-ENTMCNC: 34.3 G/DL (ref 31.4–37.4)
MCV RBC AUTO: 98 FL (ref 82–98)
P AXIS: 36 DEGREES
PLATELET # BLD AUTO: 157 THOUSANDS/UL (ref 149–390)
PMV BLD AUTO: 10.4 FL (ref 8.9–12.7)
POTASSIUM SERPL-SCNC: 4.1 MMOL/L (ref 3.5–5.3)
PR INTERVAL: 226 MS
PROTHROMBIN TIME: 13.3 SECONDS (ref 11.6–14.5)
QRS AXIS: -1 DEGREES
QRSD INTERVAL: 126 MS
QT INTERVAL: 476 MS
QTC INTERVAL: 491 MS
RBC # BLD AUTO: 5 MILLION/UL (ref 3.88–5.62)
SODIUM SERPL-SCNC: 140 MMOL/L (ref 135–147)
T WAVE AXIS: 75 DEGREES
VENTRICULAR RATE: 64 BPM
WBC # BLD AUTO: 7.36 THOUSAND/UL (ref 4.31–10.16)

## 2024-01-19 PROCEDURE — 33249 INSJ/RPLCMT DEFIB W/LEAD(S): CPT | Performed by: STUDENT IN AN ORGANIZED HEALTH CARE EDUCATION/TRAINING PROGRAM

## 2024-01-19 PROCEDURE — C1892 INTRO/SHEATH,FIXED,PEEL-AWAY: HCPCS | Performed by: STUDENT IN AN ORGANIZED HEALTH CARE EDUCATION/TRAINING PROGRAM

## 2024-01-19 PROCEDURE — 93005 ELECTROCARDIOGRAM TRACING: CPT

## 2024-01-19 PROCEDURE — 85610 PROTHROMBIN TIME: CPT | Performed by: PHYSICIAN ASSISTANT

## 2024-01-19 PROCEDURE — 71045 X-RAY EXAM CHEST 1 VIEW: CPT

## 2024-01-19 PROCEDURE — 93010 ELECTROCARDIOGRAM REPORT: CPT | Performed by: INTERNAL MEDICINE

## 2024-01-19 PROCEDURE — C1898 LEAD, PMKR, OTHER THAN TRANS: HCPCS | Performed by: STUDENT IN AN ORGANIZED HEALTH CARE EDUCATION/TRAINING PROGRAM

## 2024-01-19 PROCEDURE — C1721 AICD, DUAL CHAMBER: HCPCS | Performed by: STUDENT IN AN ORGANIZED HEALTH CARE EDUCATION/TRAINING PROGRAM

## 2024-01-19 PROCEDURE — 83735 ASSAY OF MAGNESIUM: CPT | Performed by: PHYSICIAN ASSISTANT

## 2024-01-19 PROCEDURE — 33241 REMOVE PULSE GENERATOR: CPT | Performed by: STUDENT IN AN ORGANIZED HEALTH CARE EDUCATION/TRAINING PROGRAM

## 2024-01-19 PROCEDURE — 85027 COMPLETE CBC AUTOMATED: CPT | Performed by: PHYSICIAN ASSISTANT

## 2024-01-19 PROCEDURE — 80048 BASIC METABOLIC PNL TOTAL CA: CPT | Performed by: PHYSICIAN ASSISTANT

## 2024-01-19 PROCEDURE — NC001 PR NO CHARGE: Performed by: STUDENT IN AN ORGANIZED HEALTH CARE EDUCATION/TRAINING PROGRAM

## 2024-01-19 DEVICE — LEAD 5076-52 MRI US RCMCRD
Type: IMPLANTABLE DEVICE | Site: HEART | Status: FUNCTIONAL
Brand: CAPSUREFIX NOVUS MRI™ SURESCAN®

## 2024-01-19 DEVICE — ICD DDPA2D4 COBALT XT DR MRI DF4 USA
Type: IMPLANTABLE DEVICE | Site: CHEST  WALL | Status: FUNCTIONAL
Brand: COBALT™ XT DR MRI SURESCAN™

## 2024-01-19 DEVICE — ENVELOPE CMRM6133 ABSORB LRG MR
Type: IMPLANTABLE DEVICE | Site: CHEST  WALL | Status: FUNCTIONAL
Brand: TYRX™

## 2024-01-19 RX ORDER — ACETAMINOPHEN 325 MG/1
650 TABLET ORAL EVERY 4 HOURS PRN
Status: DISCONTINUED | OUTPATIENT
Start: 2024-01-19 | End: 2024-01-19 | Stop reason: HOSPADM

## 2024-01-19 RX ORDER — LIDOCAINE HYDROCHLORIDE 10 MG/ML
INJECTION, SOLUTION EPIDURAL; INFILTRATION; INTRACAUDAL; PERINEURAL AS NEEDED
Status: DISCONTINUED | OUTPATIENT
Start: 2024-01-19 | End: 2024-01-19

## 2024-01-19 RX ORDER — PROPOFOL 10 MG/ML
INJECTION, EMULSION INTRAVENOUS AS NEEDED
Status: DISCONTINUED | OUTPATIENT
Start: 2024-01-19 | End: 2024-01-19

## 2024-01-19 RX ORDER — FENTANYL CITRATE 50 UG/ML
INJECTION, SOLUTION INTRAMUSCULAR; INTRAVENOUS AS NEEDED
Status: DISCONTINUED | OUTPATIENT
Start: 2024-01-19 | End: 2024-01-19

## 2024-01-19 RX ORDER — GENTAMICIN SULFATE 40 MG/ML
INJECTION, SOLUTION INTRAMUSCULAR; INTRAVENOUS CODE/TRAUMA/SEDATION MEDICATION
Status: DISCONTINUED | OUTPATIENT
Start: 2024-01-19 | End: 2024-01-19 | Stop reason: HOSPADM

## 2024-01-19 RX ORDER — OXYCODONE HYDROCHLORIDE AND ACETAMINOPHEN 5; 325 MG/1; MG/1
1 TABLET ORAL EVERY 6 HOURS PRN
Qty: 10 TABLET | Refills: 0 | Status: SHIPPED | OUTPATIENT
Start: 2024-01-19 | End: 2024-01-29

## 2024-01-19 RX ORDER — MIDAZOLAM HYDROCHLORIDE 2 MG/2ML
INJECTION, SOLUTION INTRAMUSCULAR; INTRAVENOUS AS NEEDED
Status: DISCONTINUED | OUTPATIENT
Start: 2024-01-19 | End: 2024-01-19

## 2024-01-19 RX ORDER — SODIUM CHLORIDE 9 MG/ML
20 INJECTION, SOLUTION INTRAVENOUS CONTINUOUS
Status: DISCONTINUED | OUTPATIENT
Start: 2024-01-19 | End: 2024-01-19 | Stop reason: HOSPADM

## 2024-01-19 RX ORDER — PROPOFOL 10 MG/ML
INJECTION, EMULSION INTRAVENOUS CONTINUOUS PRN
Status: DISCONTINUED | OUTPATIENT
Start: 2024-01-19 | End: 2024-01-19

## 2024-01-19 RX ORDER — LIDOCAINE HYDROCHLORIDE 10 MG/ML
INJECTION, SOLUTION EPIDURAL; INFILTRATION; INTRACAUDAL; PERINEURAL CODE/TRAUMA/SEDATION MEDICATION
Status: DISCONTINUED | OUTPATIENT
Start: 2024-01-19 | End: 2024-01-19 | Stop reason: HOSPADM

## 2024-01-19 RX ADMIN — FENTANYL CITRATE 12.5 MCG: 50 INJECTION INTRAMUSCULAR; INTRAVENOUS at 09:31

## 2024-01-19 RX ADMIN — PROPOFOL 20 MG: 10 INJECTION, EMULSION INTRAVENOUS at 08:57

## 2024-01-19 RX ADMIN — FENTANYL CITRATE 12.5 MCG: 50 INJECTION INTRAMUSCULAR; INTRAVENOUS at 09:06

## 2024-01-19 RX ADMIN — ACETAMINOPHEN 650 MG: 325 TABLET, FILM COATED ORAL at 11:11

## 2024-01-19 RX ADMIN — PROPOFOL 30 MCG/KG/MIN: 10 INJECTION, EMULSION INTRAVENOUS at 08:40

## 2024-01-19 RX ADMIN — LIDOCAINE HYDROCHLORIDE 100 MG: 10 INJECTION, SOLUTION EPIDURAL; INFILTRATION; INTRACAUDAL; PERINEURAL at 08:35

## 2024-01-19 RX ADMIN — EPINEPHRINE 1 MCG/MIN: 1 INJECTION, SOLUTION, CONCENTRATE INTRAVENOUS at 08:40

## 2024-01-19 RX ADMIN — FENTANYL CITRATE 25 MCG: 50 INJECTION INTRAMUSCULAR; INTRAVENOUS at 09:44

## 2024-01-19 RX ADMIN — MIDAZOLAM 0.5 MG: 1 INJECTION INTRAMUSCULAR; INTRAVENOUS at 08:35

## 2024-01-19 RX ADMIN — FENTANYL CITRATE 12.5 MCG: 50 INJECTION INTRAMUSCULAR; INTRAVENOUS at 09:02

## 2024-01-19 RX ADMIN — MIDAZOLAM 0.5 MG: 1 INJECTION INTRAMUSCULAR; INTRAVENOUS at 08:56

## 2024-01-19 RX ADMIN — CEFAZOLIN 3000 MG: 1 INJECTION, POWDER, FOR SOLUTION INTRAMUSCULAR; INTRAVENOUS at 08:42

## 2024-01-19 RX ADMIN — FENTANYL CITRATE 12.5 MCG: 50 INJECTION INTRAMUSCULAR; INTRAVENOUS at 09:40

## 2024-01-19 RX ADMIN — PROPOFOL 20 MG: 10 INJECTION, EMULSION INTRAVENOUS at 08:45

## 2024-01-19 RX ADMIN — FENTANYL CITRATE 25 MCG: 50 INJECTION INTRAMUSCULAR; INTRAVENOUS at 09:19

## 2024-01-19 RX ADMIN — PROPOFOL 20 MG: 10 INJECTION, EMULSION INTRAVENOUS at 08:52

## 2024-01-19 RX ADMIN — SODIUM CHLORIDE: 0.9 INJECTION, SOLUTION INTRAVENOUS at 08:35

## 2024-01-19 NOTE — Clinical Note
Defib pad site: left lower flank.Defib pad site: right upper chest.Defib pad site assessment: skin integrity intact.

## 2024-01-19 NOTE — Clinical Note
The ICD COBALT XT DR MRI IS1 DF4 - [id number] device was inserted. The leads were placed into the connector and visually verified to be in correct position. Injury current obtained. Mauc Instructions: By selecting yes to the question below the MAUC number will be added into the note.  This will be calculated automatically based on the diagnosis chosen, the size entered, the body zone selected (H,M,L) and the specific indications you chose. You will also have the option to override the Mohs AUC if you disagree with the automatically calculated number and this option is found in the Case Summary tab.

## 2024-01-19 NOTE — H&P
Assessment/Plan     Active Problems:    ICD (implantable 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    Myocardial infarction (HCC)    Hyperlipidemia    CAD (coronary artery disease)    CHF (congestive heart failure) (HCC)    Diabetes mellitus (HCC)    Hypertension       HPI    Kvng Bynum is an 68 y.o. male with a history of ischemic cardiomyopathy with LV dysfunction, anterior STEMI with VF arrest in 1/2013, HTN, dyslipidemia, and ascending aortic aneurysm who was seen in outpatient EP on 12/6/23 for evaluation as his device has reached ÓSCAR.  At that visit, he noted fatigue as well as RIVERO and was found to have heart rates in the high 40s in the setting of metoprolol use. We dicussed addition of an atrial lead to allow for uptitration of betablocker and with the hopes that higher heart rates would improve his fatigue  to which he was agreeable. He underwent venogram which showed vascular patency and was scheduled for upgrade to dual chamber ICD. He presents today for that procedure.       Past Surgical History:   Procedure Laterality Date    COLONOSCOPY      ESOPHAGOGASTRODUODENOSCOPY      INSERT / REPLACE / REMOVE PACEMAKER      IR UPPER EXTREMITY VENOGRAM- DIAGNOSTIC  12/22/2023    PA EDG US EXAM SURGICAL ALTER STOM DUODENUM/JEJUNUM N/A 12/26/2018    Procedure: RADIAL ENDOSCOPIC U/S WITH POLYPECTOMY;  Surgeon: Bob Andrea MD;  Location: [facility];  Service: Gastroenterology    TONSILLECTOMY         Review of Systems  A complete review of systems was negative in 10/10 systems or as listed above.     Objective     There were no vitals taken for this visit.    Physical Exam  GEN: Now acute distress, Alert and oriented, well appearing  HEENT: Normocephalic, atraumatic.  Neck is thick  CARDIOVASCULAR: RRR, distant heart sounds, no murmur, rub, gallops S1,S2  LUNGS: Clear To auscultation bilaterally, normal effort, no rales, rhonchi, crackles  ABDOMEN: Protuberant, soft, nondistended, nontender, without obvious  organomegaly or ascites  EXTREMITIES/VASCULAR: No edema. warm an well perfused.  PSYCH: Normal Affect, no overt suicidal ideation, linear speech pattern without evidence of psychosis.   NEURO: Grossly intact, moving all extremiteis equal, face symmetric, alert and responsive, no obvious focal defecits  HEME: No bleeding, bruising, petechia, purpura  SKIN: No significant rashes, warm, no diaphoresis or pallor.     .SOC

## 2024-01-19 NOTE — ANESTHESIA POSTPROCEDURE EVALUATION
Post-Op Assessment Note    CV Status:  Stable  Pain Score: 0    Pain management: adequate       Mental Status:  Alert and awake   Hydration Status:  Euvolemic   PONV Controlled:  Controlled   Airway Patency:  Patent     Post Op Vitals Reviewed: Yes      Staff: CRNA               BP   133/89   Temp      Pulse 80   Resp 13   SpO2 95

## 2024-01-22 LAB
ATRIAL RATE: 71 BPM
P AXIS: 33 DEGREES
PR INTERVAL: 222 MS
QRS AXIS: -13 DEGREES
QRSD INTERVAL: 116 MS
QT INTERVAL: 436 MS
QTC INTERVAL: 473 MS
T WAVE AXIS: 80 DEGREES
VENTRICULAR RATE: 71 BPM

## 2024-01-22 PROCEDURE — 93010 ELECTROCARDIOGRAM REPORT: CPT | Performed by: INTERNAL MEDICINE

## 2024-02-02 ENCOUNTER — IN-CLINIC DEVICE VISIT (OUTPATIENT)
Dept: CARDIOLOGY CLINIC | Facility: CLINIC | Age: 69
End: 2024-02-02

## 2024-02-02 DIAGNOSIS — Z95.810 PRESENCE OF IMPLANTABLE CARDIOVERTER-DEFIBRILLATOR (ICD): Primary | ICD-10-CM

## 2024-02-02 PROCEDURE — 99024 POSTOP FOLLOW-UP VISIT: CPT

## 2024-02-02 NOTE — PROGRESS NOTES
Results for orders placed or performed in visit on 02/02/24   Cardiac EP device report    Narrative    MDT DUAL ICD/ ACTIVE SYSTEM IS MRI CONDITIONAL  DEVICE INTERROGATED IN THE [facility] OFFICE. BATTERY VOLTAGE ADEQUATE (10.5 YRS). AP 84.6%  <0.1% ALL LEAD PARAMETERS WITHIN NORMAL LIMITS. NO SIGNIFICANT HIGH RATE EPISODES. NO PROGRAMMING CHANGES MADE TO DEVICE PARAMETERS. OPTI-VOL INITIALIZING. WOUND CHECK: INCISION CLEAN AND DRY WITH EDGES APPROXIMATED; WOUND CARE AND RESTRICTIONS REVIEWED WITH PATIENT. NORMAL DEVICE FUNCTION. AM

## 2024-05-03 ENCOUNTER — IN-CLINIC DEVICE VISIT (OUTPATIENT)
Dept: CARDIOLOGY CLINIC | Facility: CLINIC | Age: 69
End: 2024-05-03
Payer: COMMERCIAL

## 2024-05-03 DIAGNOSIS — Z95.810 PRESENCE OF IMPLANTABLE CARDIOVERTER-DEFIBRILLATOR (ICD): Primary | ICD-10-CM

## 2024-05-03 PROCEDURE — 93283 PRGRMG EVAL IMPLANTABLE DFB: CPT | Performed by: STUDENT IN AN ORGANIZED HEALTH CARE EDUCATION/TRAINING PROGRAM

## 2024-05-03 NOTE — PROGRESS NOTES
Results for orders placed or performed in visit on 05/03/24   Cardiac EP device report    Narrative    MDT DUAL ICD/ ACTIVE SYSTEM IS MRI CONDITIONAL  DEVICE INTERROGATED IN THE [facility] OFFICE. BATTERY VOLTAGE ADEQUATE (11.8 YRS). AP 71%  <0.1%. ALL LEAD PARAMETERS WITHIN NORMAL LIMITS. 1 VT NS EPISODE W/EGM SHOWING NSVT, 6 BEATS @ 240 BPM.  PT TAKING METOPROLOL SUCC, ASA.  OPTI-VOL WITHIN NORMAL LIMITS. NO PROGRAMMING CHANGES MADE TO DEVICE PARAMETERS. NORMAL DEVICE FUNCTION. PAS/GV

## 2024-06-16 ENCOUNTER — APPOINTMENT (EMERGENCY)
Dept: RADIOLOGY | Facility: HOSPITAL | Age: 69
DRG: 689 | End: 2024-06-16
Payer: COMMERCIAL

## 2024-06-16 ENCOUNTER — HOSPITAL ENCOUNTER (INPATIENT)
Facility: HOSPITAL | Age: 69
LOS: 1 days | Discharge: HOME | DRG: 689 | End: 2024-06-17
Attending: EMERGENCY MEDICINE | Admitting: INTERNAL MEDICINE
Payer: COMMERCIAL

## 2024-06-16 DIAGNOSIS — N13.2 URETERAL STONE WITH HYDRONEPHROSIS: Primary | ICD-10-CM

## 2024-06-16 DIAGNOSIS — I71.20 THORACIC AORTIC ANEURYSM (HCC): ICD-10-CM

## 2024-06-16 DIAGNOSIS — I10 HYPERTENSION: ICD-10-CM

## 2024-06-16 LAB
2HR DELTA HS TROPONIN: 7 NG/L
ALBUMIN SERPL BCP-MCNC: 4.1 G/DL (ref 3.5–5)
ALP SERPL-CCNC: 60 U/L (ref 34–104)
ALT SERPL W P-5'-P-CCNC: 36 U/L (ref 7–52)
AMORPH URATE CRY URNS QL MICRO: ABNORMAL
ANION GAP SERPL CALCULATED.3IONS-SCNC: 7 MMOL/L (ref 4–13)
AST SERPL W P-5'-P-CCNC: 32 U/L (ref 13–39)
ATRIAL RATE: 76 BPM
BACTERIA UR QL AUTO: ABNORMAL /HPF
BASOPHILS # BLD AUTO: 0.05 THOUSANDS/ÂΜL (ref 0–0.1)
BASOPHILS NFR BLD AUTO: 1 % (ref 0–1)
BILIRUB SERPL-MCNC: 0.41 MG/DL (ref 0.2–1)
BILIRUB UR QL STRIP: NEGATIVE
BUN SERPL-MCNC: 18 MG/DL (ref 5–25)
CALCIUM SERPL-MCNC: 9.5 MG/DL (ref 8.4–10.2)
CARDIAC TROPONIN I PNL SERPL HS: 24 NG/L
CARDIAC TROPONIN I PNL SERPL HS: 31 NG/L
CHLORIDE SERPL-SCNC: 105 MMOL/L (ref 96–108)
CLARITY UR: ABNORMAL
CO2 SERPL-SCNC: 26 MMOL/L (ref 21–32)
COLOR UR: ABNORMAL
CREAT SERPL-MCNC: 0.77 MG/DL (ref 0.6–1.3)
EOSINOPHIL # BLD AUTO: 0.17 THOUSAND/ÂΜL (ref 0–0.61)
EOSINOPHIL NFR BLD AUTO: 3 % (ref 0–6)
ERYTHROCYTE [DISTWIDTH] IN BLOOD BY AUTOMATED COUNT: 13.3 % (ref 11.6–15.1)
GFR SERPL CREATININE-BSD FRML MDRD: 94 ML/MIN/1.73SQ M
GLUCOSE SERPL-MCNC: 183 MG/DL (ref 65–140)
GLUCOSE UR STRIP-MCNC: ABNORMAL MG/DL
HCT VFR BLD AUTO: 44.9 % (ref 36.5–49.3)
HGB BLD-MCNC: 15.1 G/DL (ref 12–17)
HGB UR QL STRIP.AUTO: ABNORMAL
IMM GRANULOCYTES # BLD AUTO: 0.03 THOUSAND/UL (ref 0–0.2)
IMM GRANULOCYTES NFR BLD AUTO: 0 % (ref 0–2)
KETONES UR STRIP-MCNC: NEGATIVE MG/DL
LEUKOCYTE ESTERASE UR QL STRIP: ABNORMAL
LYMPHOCYTES # BLD AUTO: 2.35 THOUSANDS/ÂΜL (ref 0.6–4.47)
LYMPHOCYTES NFR BLD AUTO: 35 % (ref 14–44)
MCH RBC QN AUTO: 33.6 PG (ref 26.8–34.3)
MCHC RBC AUTO-ENTMCNC: 33.6 G/DL (ref 31.4–37.4)
MCV RBC AUTO: 100 FL (ref 82–98)
MONOCYTES # BLD AUTO: 0.53 THOUSAND/ÂΜL (ref 0.17–1.22)
MONOCYTES NFR BLD AUTO: 8 % (ref 4–12)
MUCOUS THREADS UR QL AUTO: ABNORMAL
NEUTROPHILS # BLD AUTO: 3.67 THOUSANDS/ÂΜL (ref 1.85–7.62)
NEUTS SEG NFR BLD AUTO: 53 % (ref 43–75)
NITRITE UR QL STRIP: NEGATIVE
NON-SQ EPI CELLS URNS QL MICRO: ABNORMAL /HPF
NRBC BLD AUTO-RTO: 0 /100 WBCS
P AXIS: 7 DEGREES
PH UR STRIP.AUTO: 5.5 [PH]
PLATELET # BLD AUTO: 142 THOUSANDS/UL (ref 149–390)
PMV BLD AUTO: 10.7 FL (ref 8.9–12.7)
POTASSIUM SERPL-SCNC: 4.1 MMOL/L (ref 3.5–5.3)
PR INTERVAL: 230 MS
PROT SERPL-MCNC: 7 G/DL (ref 6.4–8.4)
PROT UR STRIP-MCNC: ABNORMAL MG/DL
QRS AXIS: -20 DEGREES
QRSD INTERVAL: 126 MS
QT INTERVAL: 402 MS
QTC INTERVAL: 452 MS
RBC # BLD AUTO: 4.5 MILLION/UL (ref 3.88–5.62)
RBC #/AREA URNS AUTO: ABNORMAL /HPF
SODIUM SERPL-SCNC: 138 MMOL/L (ref 135–147)
SP GR UR STRIP.AUTO: 1.02 (ref 1–1.03)
T WAVE AXIS: 132 DEGREES
UROBILINOGEN UR STRIP-ACNC: <2 MG/DL
VENTRICULAR RATE: 76 BPM
WBC # BLD AUTO: 6.8 THOUSAND/UL (ref 4.31–10.16)
WBC #/AREA URNS AUTO: ABNORMAL /HPF

## 2024-06-16 PROCEDURE — 81001 URINALYSIS AUTO W/SCOPE: CPT

## 2024-06-16 PROCEDURE — 85025 COMPLETE CBC W/AUTO DIFF WBC: CPT

## 2024-06-16 PROCEDURE — 36415 COLL VENOUS BLD VENIPUNCTURE: CPT

## 2024-06-16 PROCEDURE — 99285 EMERGENCY DEPT VISIT HI MDM: CPT | Performed by: EMERGENCY MEDICINE

## 2024-06-16 PROCEDURE — 84484 ASSAY OF TROPONIN QUANT: CPT

## 2024-06-16 PROCEDURE — 96365 THER/PROPH/DIAG IV INF INIT: CPT

## 2024-06-16 PROCEDURE — 80053 COMPREHEN METABOLIC PANEL: CPT

## 2024-06-16 PROCEDURE — 99285 EMERGENCY DEPT VISIT HI MDM: CPT

## 2024-06-16 PROCEDURE — 93010 ELECTROCARDIOGRAM REPORT: CPT | Performed by: INTERNAL MEDICINE

## 2024-06-16 PROCEDURE — 74176 CT ABD & PELVIS W/O CONTRAST: CPT

## 2024-06-16 PROCEDURE — 93005 ELECTROCARDIOGRAM TRACING: CPT

## 2024-06-16 PROCEDURE — 87086 URINE CULTURE/COLONY COUNT: CPT

## 2024-06-16 PROCEDURE — 71046 X-RAY EXAM CHEST 2 VIEWS: CPT

## 2024-06-16 RX ADMIN — CEFTRIAXONE SODIUM 1000 MG: 10 INJECTION, POWDER, FOR SOLUTION INTRAVENOUS at 23:11

## 2024-06-16 NOTE — ED PROVIDER NOTES
History  Chief Complaint   Patient presents with    Back Pain     Pt stated he is having right lower back pain and right lower abd pain. Pt SOB. Pt stated he has a Hx of an AAA.     68-year-old male with past medical history CAD with ICD placement, diabetes, abdominal aortic aneurysm, kidney stones, presents ED for evaluation of right lower back pain beginning last night.  Patient states that he has intermittent pain in his right lower back and right lower abdominal pain.  Patient states that the pain worsened this morning.  He also notes hematuria this morning.  He also reports dysuria.  Patient states that this feels similar to when he had kidney stones in the past.  He denies associated fever, chills, chest pain, nausea, vomiting, palpitations.  Patient reports that upon arrival to the emergency department he was sweaty and short of breath because he walked here.  Patient does not take blood thinning medications other than aspirin        Prior to Admission Medications   Prescriptions Last Dose Informant Patient Reported? Taking?   Cholecalciferol (VITAMIN D3) 1000 units CAPS  Self Yes No   Sig: Take by mouth   ERGOCALCIFEROL PO  Self Yes No   Sig: Take by mouth   Patient not taking: Reported on 12/6/2023   Omega-3 Fatty Acids (FISH OIL) 1200 MG CAPS  Self Yes No   Sig: Take by mouth   Patient not taking: Reported on 12/6/2023   amLODIPine (NORVASC) 5 mg tablet  Self Yes No   Sig: Take 1 tablet by mouth daily   aspirin 81 MG tablet  Self Yes No   Sig: Take 1 tablet by mouth daily   atorvastatin (LIPITOR) 80 mg tablet  Self Yes No   Sig: Take 1 tablet by mouth daily   cyanocobalamin 100 MCG tablet  Self Yes No   Sig: Take 100 mcg by mouth daily   eplerenone (INSPRA) 25 mg tablet  Self Yes No   Sig: Take 25 mg by mouth daily   lisinopril (ZESTRIL) 40 mg tablet  Self Yes No   Sig: Take 1 tablet by mouth daily   metFORMIN (GLUCOPHAGE) 500 mg tablet  Self Yes No   Sig: Take 1 tablet by mouth 2 (two) times a day    metoprolol succinate (TOPROL-XL) 100 mg 24 hr tablet  Self Yes No   Sig: Take 0.5 tablets by mouth   nicotine polacrilex (COMMIT) 4 MG lozenge   Yes No   Sig: Apply 1 lozenge to the mouth or throat as needed   rosuvastatin (CRESTOR) 40 MG tablet  Self Yes No   Sig: Take 40 mg by mouth daily      Facility-Administered Medications: None       Past Medical History:   Diagnosis Date    Aortic disease ([facility])     CAD (coronary artery disease) 1/18/2024    CHF (congestive heart failure) ([facility]) 1/18/2024    Diabetes mellitus ([facility]) 1/18/2024    Hyperlipidemia 1/18/2024    Hypertension 1/18/2024    ICD (implantable 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 1/18/2024    Mitral regurgitation     Myocardial infarction ([facility]) 1/18/2024    Valvular heart disease        Past Surgical History:   Procedure Laterality Date    CARDIAC ELECTROPHYSIOLOGY PROCEDURE N/A 1/19/2024    Procedure: Cardiac icd generator change and DEVICE UPGRADE TO DC ICD;  Surgeon: Kobi Kitchen MD;  Location: [facility];  Service: Cardiology    COLONOSCOPY      ESOPHAGOGASTRODUODENOSCOPY      INSERT / REPLACE / REMOVE PACEMAKER      IR UPPER EXTREMITY VENOGRAM- DIAGNOSTIC  12/22/2023    [place] EDG US EXAM SURGICAL ALTER STOM DUODENUM/JEJUNUM N/A 12/26/2018    Procedure: RADIAL ENDOSCOPIC U/S WITH POLYPECTOMY;  Surgeon: Bob Andrea MD;  Location: [facility];  Service: Gastroenterology    TONSILLECTOMY         History reviewed. No pertinent family history.  I have reviewed and agree with the history as documented.    E-Cigarette/Vaping     E-Cigarette/Vaping Substances     Social History     Tobacco Use    Smoking status: Some Days    Smokeless tobacco: Never    Tobacco comments:     1 OR 2 CIGS DAILY   Substance Use Topics    Alcohol use: Yes     Comment: 3X/WEEK 6 P[employer]    Drug use: No        Review of Systems   Constitutional:  Positive for diaphoresis. Negative for chills and fever.   HENT:  Negative for congestion.    Respiratory:  Positive for shortness  of breath. Negative for cough.    Cardiovascular:  Negative for chest pain and palpitations.   Gastrointestinal:  Positive for abdominal pain. Negative for diarrhea, nausea and vomiting.   Genitourinary:  Positive for dysuria, flank pain and hematuria. Negative for difficulty urinating.   Musculoskeletal:  Positive for back pain.   Skin:  Negative for color change.   Neurological:  Negative for dizziness, weakness, light-headedness, numbness and headaches.       Physical Exam  ED Triage Vitals [06/16/24 1919]   Temperature Pulse Respirations Blood Pressure SpO2   (!) 96.7 °F (35.9 °C) (!) 53 20 (!) 170/105 100 %      Temp Source Heart Rate Source Patient Position - Orthostatic VS BP Location FiO2 (%)   Temporal Monitor Sitting Left arm --      Pain Score       7             Orthostatic Vital Signs  Vitals:    06/17/24 0115 06/17/24 0130 06/17/24 0630 06/17/24 1025   BP: 137/81 119/59 131/72 140/77   Pulse: 68 70 79 93   Patient Position - Orthostatic VS: Lying Lying Lying        Physical Exam  Vitals and nursing note reviewed.   Constitutional:       General: He is not in acute distress.     Appearance: Normal appearance. He is normal weight. He is diaphoretic. He is not ill-appearing or toxic-appearing.   HENT:      Head: Normocephalic and atraumatic.      Nose: No rhinorrhea.      Mouth/Throat:      Mouth: Mucous membranes are moist.   Eyes:      Conjunctiva/sclera: Conjunctivae normal.   Cardiovascular:      Rate and Rhythm: Normal rate and regular rhythm.      Pulses: Normal pulses.      Heart sounds: Normal heart sounds.   Pulmonary:      Effort: Pulmonary effort is normal.      Breath sounds: Normal breath sounds.   Abdominal:      General: There is no distension.      Palpations: Abdomen is soft.      Tenderness: There is no abdominal tenderness. There is right CVA tenderness. There is no left CVA tenderness.   Musculoskeletal:      Cervical back: Neck supple.      Right lower leg: No edema.      Left lower  leg: No edema.   Skin:     General: Skin is warm.   Neurological:      Mental Status: He is alert and oriented to person, place, and time.         ED Medications  Medications   cyanocobalamin (VITAMIN B-12) tablet 100 mcg (has no administration in time range)   metoprolol succinate (TOPROL-XL) 24 hr tablet 50 mg (50 mg Oral Given 6/17/24 1025)   atorvastatin (LIPITOR) tablet 80 mg (has no administration in time range)   acetaminophen (TYLENOL) tablet 650 mg (has no administration in time range)   nicotine (NICODERM CQ) 21 mg/24 hr TD 24 hr patch 1 patch (1 patch Transdermal Not Given 6/17/24 1025)   insulin lispro (HumALOG/ADMELOG) 100 units/mL subcutaneous injection 1-5 Units (2 Units Subcutaneous Given 6/17/24 0159)   ceftriaxone (ROCEPHIN) 1 g/50 mL in dextrose IVPB (has no administration in time range)   oxyCODONE (ROXICODONE) IR tablet 5 mg (5 mg Oral Given 6/17/24 0516)   HYDROmorphone (DILAUDID) injection 0.5 mg (0.5 mg Intravenous Given 6/17/24 0152)   tamsulosin (FLOMAX) capsule 0.4 mg (has no administration in time range)   multi-electrolyte (PLASMALYTE-A/ISOLYTE-S PH 7.4) IV solution (0 mL/hr Intravenous Stopped 6/17/24 1047)   aspirin chewable tablet 81 mg (81 mg Oral Given 6/17/24 1025)   ceftriaxone (ROCEPHIN) 1 g/50 mL in dextrose IVPB (0 mg Intravenous Stopped 6/16/24 2345)       Diagnostic Studies  Results Reviewed       Procedure Component Value Units Date/Time    Fingerstick Glucose (POCT) [[id number]]  (Abnormal) Collected: 06/17/24 0939    Lab Status: Final result Specimen: Blood Updated: 06/17/24 0940     POC Glucose 165 mg/dl     Comprehensive metabolic panel [[id number]] Collected: 06/17/24 0513    Lab Status: Final result Specimen: Blood from Arm, Right Updated: 06/17/24 0552     Sodium 141 mmol/L      Potassium 4.2 mmol/L      Chloride 106 mmol/L      CO2 28 mmol/L      ANION GAP 7 mmol/L      BUN 16 mg/dL      Creatinine 0.67 mg/dL      Glucose 131 mg/dL      Calcium 8.8 mg/dL      AST 27  U/L      ALT 32 U/L      Alkaline Phosphatase 48 U/L      Total Protein 6.5 g/dL      Albumin 3.8 g/dL      Total Bilirubin 0.68 mg/dL      eGFR 100 ml/min/1.73sq m     Narrative:      National Kidney Disease Foundation guidelines for Chronic Kidney Disease (CKD):     Stage 1 with normal or high GFR (GFR > 90 mL/min/1.73 square meters)    Stage 2 Mild CKD (GFR = 60-89 mL/min/1.73 square meters)    Stage 3A Moderate CKD (GFR = 45-59 mL/min/1.73 square meters)    Stage 3B Moderate CKD (GFR = 30-44 mL/min/1.73 square meters)    Stage 4 Severe CKD (GFR = 15-29 mL/min/1.73 square meters)    Stage 5 End Stage CKD (GFR <15 mL/min/1.73 square meters)  Note: GFR calculation is accurate only with a steady state creatinine    Magnesium [[id number]]  (Normal) Collected: 06/17/24 0513    Lab Status: Final result Specimen: Blood from Arm, Right Updated: 06/17/24 0552     Magnesium 1.9 mg/dL     Protime-INR [[id number]]  (Normal) Collected: 06/17/24 0513    Lab Status: Final result Specimen: Blood from Arm, Right Updated: 06/17/24 0548     Protime 14.4 seconds      INR 1.13    APTT [[id number]]  (Normal) Collected: 06/17/24 0513    Lab Status: Final result Specimen: Blood from Arm, Right Updated: 06/17/24 0548     PTT 25 seconds     CBC and differential [[id number]]  (Abnormal) Collected: 06/17/24 0513    Lab Status: Final result Specimen: Blood from Arm, Right Updated: 06/17/24 0546     WBC 6.02 Thousand/uL      RBC 4.29 Million/uL      Hemoglobin 14.4 g/dL      Hematocrit 43.5 %       fL      MCH 33.6 pg      MCHC 33.1 g/dL      RDW 13.5 %      MPV 10.3 fL      Platelets 144 Thousands/uL      nRBC 0 /100 WBCs      Segmented % 52 %      Immature Grans % 1 %      Lymphocytes % 33 %      Monocytes % 10 %      Eosinophils Relative 3 %      Basophils Relative 1 %      Absolute Neutrophils 3.27 Thousands/µL      Absolute Immature Grans 0.03 Thousand/uL      Absolute Lymphocytes 1.97 Thousands/µL      Absolute Monocytes 0.57  Thousand/µL      Eosinophils Absolute 0.15 Thousand/µL      Basophils Absolute 0.03 Thousands/µL     Hemoglobin A1c w/EAG Estimation (Prechecked if no A1C within 90 days) [[id number]]  (Abnormal) Collected: 06/17/24 0513    Lab Status: Final result Specimen: Blood from Arm, Right Updated: 06/17/24 0528     Hemoglobin A1C 7.3 %       mg/dl     Fingerstick Glucose (POCT) [[id number]]  (Abnormal) Collected: 06/17/24 0150    Lab Status: Final result Specimen: Blood Updated: 06/17/24 0151     POC Glucose 238 mg/dl     HS Troponin I 4hr [[id number]]  (Normal) Collected: 06/16/24 2354    Lab Status: Final result Specimen: Blood from Arm, Right Updated: 06/17/24 0023     hs TnI 4hr 39 ng/L      Delta 4hr hsTnI 15 ng/L     HS Troponin I 2hr [[id number]]  (Normal) Collected: 06/16/24 2146    Lab Status: Final result Specimen: Blood from Arm, Right Updated: 06/16/24 2233     hs TnI 2hr 31 ng/L      Delta 2hr hsTnI 7 ng/L     Urine Microscopic [[id number]]  (Abnormal) Collected: 06/16/24 2153    Lab Status: Final result Specimen: Urine, Clean Catch Updated: 06/16/24 2224     RBC, UA Innumerable /hpf      WBC, UA 20-30 /hpf      Epithelial Cells None Seen /hpf      Bacteria, UA Moderate /hpf      MUCUS THREADS Innumerable     Amorphous Crystals, UA Occasional    Urine culture [[id number]] Collected: 06/16/24 2153    Lab Status: In process Specimen: Urine, Clean Catch Updated: 06/16/24 2224    UA w Reflex to Microscopic w Reflex to Culture [[id number]]  (Abnormal) Collected: 06/16/24 2153    Lab Status: Final result Specimen: Urine, Clean Catch Updated: 06/16/24 2223     Color, UA Orange     Clarity, UA Extra Turbid     Specific Gravity, UA 1.022     pH, UA 5.5     Leukocytes, UA Trace     Nitrite, UA Negative     Protein,  (2+) mg/dl      Glucose, UA Trace mg/dl      Ketones, UA Negative mg/dl      Urobilinogen, UA <2.0 mg/dl      Bilirubin, UA Negative     Occult Blood, UA Large    HS Troponin 0hr (reflex protocol)  [[id number]]  (Normal) Collected: 06/16/24 1948    Lab Status: Final result Specimen: Blood from Arm, Right Updated: 06/16/24 2030     hs TnI 0hr 24 ng/L     Comprehensive metabolic panel [[id number]]  (Abnormal) Collected: 06/16/24 1948    Lab Status: Final result Specimen: Blood from Arm, Right Updated: 06/16/24 2022     Sodium 138 mmol/L      Potassium 4.1 mmol/L      Chloride 105 mmol/L      CO2 26 mmol/L      ANION GAP 7 mmol/L      BUN 18 mg/dL      Creatinine 0.77 mg/dL      Glucose 183 mg/dL      Calcium 9.5 mg/dL      AST 32 U/L      ALT 36 U/L      Alkaline Phosphatase 60 U/L      Total Protein 7.0 g/dL      Albumin 4.1 g/dL      Total Bilirubin 0.41 mg/dL      eGFR 94 ml/min/1.73sq m     Narrative:      National Kidney Disease Foundation guidelines for Chronic Kidney Disease (CKD):     Stage 1 with normal or high GFR (GFR > 90 mL/min/1.73 square meters)    Stage 2 Mild CKD (GFR = 60-89 mL/min/1.73 square meters)    Stage 3A Moderate CKD (GFR = 45-59 mL/min/1.73 square meters)    Stage 3B Moderate CKD (GFR = 30-44 mL/min/1.73 square meters)    Stage 4 Severe CKD (GFR = 15-29 mL/min/1.73 square meters)    Stage 5 End Stage CKD (GFR <15 mL/min/1.73 square meters)  Note: GFR calculation is accurate only with a steady state creatinine    CBC and differential [[id number]]  (Abnormal) Collected: 06/16/24 1948    Lab Status: Final result Specimen: Blood from Arm, Right Updated: 06/16/24 2013     WBC 6.80 Thousand/uL      RBC 4.50 Million/uL      Hemoglobin 15.1 g/dL      Hematocrit 44.9 %       fL      MCH 33.6 pg      MCHC 33.6 g/dL      RDW 13.3 %      MPV 10.7 fL      Platelets 142 Thousands/uL      nRBC 0 /100 WBCs      Segmented % 53 %      Immature Grans % 0 %      Lymphocytes % 35 %      Monocytes % 8 %      Eosinophils Relative 3 %      Basophils Relative 1 %      Absolute Neutrophils 3.67 Thousands/µL      Absolute Immature Grans 0.03 Thousand/uL      Absolute Lymphocytes 2.35 Thousands/µL       "Absolute Monocytes 0.53 Thousand/µL      Eosinophils Absolute 0.17 Thousand/µL      Basophils Absolute 0.05 Thousands/µL                    XR chest 2 views   Final Result by Sussy Orona MD (06/16 2157)      No acute cardiopulmonary disease.               Workstation performed: [id number]         CT abdomen pelvis wo contrast   Final Result by Ty New MD (06/16 2117)      Right flank pain can be explained by 2 mm calculus in the midline of the bladder that likely recently passed as well as a distal right ureteral calculus measuring 2 mm resulting in hydronephrosis.      Bilateral nonobstructing intrarenal calculi as described.      Stable cardiomegaly.      Multiple areas of vascular aneurysm for example descending thoracic aorta aneurysm measures 6.1 cm at the diaphragmatic hiatus (previous 4.6 cm).      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      Aneurysm of the common hepatic artery measures 3.9 cm (previous 2.5 cm).      Aneurysm of the left common iliac artery measures 2.6 cm (previous 2.1 cm).      Calcified dissection flap noted in the abdominal aorta consistent with chronic dissection.      Workstation performed: [id number]               Procedures  Procedures      ED Course  ED Course as of 06/17/24 1244   Sun Jun 16, 2024 1946 Will get the patient to CAT scan immediately for CT abdomen pelvis   2108 hs TnI 0hr: 24         CRAFFT      Flowsheet Row Most Recent Value   CRAFFT Initial Screen: During the past 12 months, did you:    1. Drink any alcohol (more than a few sips)?  No Filed at: 06/16/2024 1932   2. Smoke any marijuana or hashish No Filed at: 06/16/2024 1932   3. Use anything else to get high? (\"anything else\" includes illegal drugs, over the counter and prescription drugs, and things that you sniff or 'ramos')? No Filed at: 06/16/2024 1932            HEART Risk Score      Flowsheet Row Most Recent Value   Heart Score Risk Calculator    History 1 Filed at: " 06/16/2024 2116   ECG 1 Filed at: 06/16/2024 2116   Age 2 Filed at: 06/16/2024 2116   Risk Factors 2 Filed at: 06/16/2024 2116   Troponin 1 Filed at: 06/16/2024 2116   HEART Score 7 Filed at: 06/16/2024 2116          Identification of Seniors at Risk      Flowsheet Row Most Recent Value   (ISAR) Identification of Seniors at Risk    Before the illness or injury that brought you to the Emergency, did you need someone to help you on a regular basis? 0 Filed at: 06/16/2024 1921   In the last 24 hours, have you needed more help than usual? 0 Filed at: 06/16/2024 1921   Have you been hospitalized for one or more nights during the past 6 months? 0 Filed at: 06/16/2024 1921   In general, do you see well? 1 Filed at: 06/16/2024 1921   In general, do you have serious problems with your memory? 0 Filed at: 06/16/2024 1921   Do you take more than three different medications every day? 1 Filed at: 06/16/2024 1921   ISAR Score 2 Filed at: 06/16/2024 1921                      SBIRT 20yo+      Flowsheet Row Most Recent Value   Initial Alcohol Screen: US AUDIT-C     1. How often do you have a drink containing alcohol? 0 Filed at: 06/16/2024 1932   2. How many drinks containing alcohol do you have on a typical day you are drinking?  0 Filed at: 06/16/2024 1932   3a. Male UNDER 65: How often do you have five or more drinks on one occasion? 0 Filed at: 06/16/2024 1932   3b. FEMALE Any Age, or MALE 65+: How often do you have 4 or more drinks on one occassion? 0 Filed at: 06/16/2024 1932   Audit-C Score 0 Filed at: 06/16/2024 1932   KATY: How many times in the past year have you...    Used an illegal drug or used a prescription medication for non-medical reasons? Never Filed at: 06/16/2024 1932                  Medical Decision Making  68-year-old male presents ED for evaluation of right-sided back pain and abdominal pain beginning last night.  Patient also had associated hematuria this morning.  He reports shortness of breath upon  arrival to emergency department, but this is resolved.  He denies associated fever, chills, nausea, vomiting, chest pain.  On exam, patient is diaphoretic, no acute distress.  Afebrile.  Heart regular rate and rhythm.  Lungs clear to auscultation bilaterally.  Abdomen soft nontender nondistended.  Minimal right CVA tenderness.  Differential diagnosis: I suspect nephrolithiasis, as patient has a history of this and states it feels similar the pain is intermittent and patient had associated hematuria..  I am also considering abdominal aortic aneurysm rupture, given patient's diaphoretic appearance and history of 5 cm abdominal aortic aneurysm.  Also considering ACS given the patient's cardiac history.  Plan: EKG, CBC, CMP, troponin.  Will obtain CT abdomen pelvis stat.  Please see ED course for additional information.    Amount and/or Complexity of Data Reviewed  Labs: ordered. Decision-making details documented in ED Course.  Radiology: ordered.          Disposition  Final diagnoses:   Thoracic aortic aneurysm (HCC)   Ureteral stone with hydronephrosis     Time reflects when diagnosis was documented in both MDM as applicable and the Disposition within this note       Time User Action Codes Description Comment    6/16/2024 10:13 PM Darshan Goins Add [I71.20] Thoracic aortic aneurysm (HCC)     6/16/2024 10:13 PM Darshan Goins Add [N13.2] Ureteral stone with hydronephrosis     6/16/2024 10:13 PM Darshan Goins Modify [I71.20] Thoracic aortic aneurysm (HCC)     6/16/2024 10:13 PM Darshan Goins Modify [N13.2] Ureteral stone with hydronephrosis     6/17/2024  9:59 AM Bree Flores Add [I10] Hypertension           ED Disposition       ED Disposition   Discharge    Condition   --    Date/Time   Mon Jun 17, 2024 1044    Comment   Kvng Bynum discharge to home/self care.                   Follow-up Information       Follow up With Specialties Details Why Contact Info Additional Information    [facility]  [facility] Call in 1 day  [address]  [address]  [facility] [address]  [phone] [facility] [place], [address], [place], [id number] [phone]            Discharge Medication List as of 6/17/2024 10:26 AM        START taking these medications    Details   cephalexin (KEFLEX) 500 mg capsule Take 1 capsule (500 mg total) by mouth every 6 (six) hours for 6 days, Starting Mon 6/17/2024, Until Sun 6/23/2024, Normal      oxyCODONE (ROXICODONE) 5 immediate release tablet Take 1 tablet (5 mg total) by mouth every 6 (six) hours as needed for moderate pain for up to 10 days Max Daily Amount: 20 mg, Starting Mon 6/17/2024, Until Thu 6/27/2024 at 2359, Normal      tamsulosin (FLOMAX) 0.4 mg Take 1 capsule (0.4 mg total) by mouth daily with dinner, Starting Mon 6/17/2024, Normal           CONTINUE these medications which have CHANGED    Details   amLODIPine (NORVASC) 5 mg tablet Take 1 tablet (5 mg total) by mouth daily, Starting Mon 6/17/2024, No Print           CONTINUE these medications which have NOT CHANGED    Details   aspirin 81 MG tablet Take 1 tablet by mouth daily, Historical Med      Cholecalciferol (VITAMIN D3) 1000 units CAPS Take by mouth, Starting Mon 1/27/2014, Historical Med      cyanocobalamin 100 MCG tablet Take 100 mcg by mouth daily, Historical Med      eplerenone (INSPRA) 25 mg tablet Take 25 mg by mouth daily, Historical Med      lisinopril (ZESTRIL) 40 mg tablet Take 1 tablet by mouth daily, Starting Wed 8/28/2013, Historical Med      metFORMIN (GLUCOPHAGE) 500 mg tablet Take 1 tablet by mouth 2 (two) times a day, Starting Tue 2/7/2012, Historical Med      metoprolol succinate (TOPROL-XL) 100 mg 24 hr tablet Take 0.5 tablets by mouth, Starting Thu 12/10/2015, Historical Med      nicotine polacrilex (COMMIT) 4 MG lozenge Apply 1 lozenge to the mouth or throat as needed, Historical Med      rosuvastatin (CRESTOR) 40 MG tablet  Take 40 mg by mouth daily, Historical Med           STOP taking these medications       atorvastatin (LIPITOR) 80 mg tablet Comments:   Reason for Stopping:         ERGOCALCIFEROL PO Comments:   Reason for Stopping:         Omega-3 Fatty Acids (FISH OIL) 1200 MG CAPS Comments:   Reason for Stopping:             Outpatient Discharge Orders   Ambulatory Referral to Vascular Surgery   Standing Status: Future Standing Exp. Date: 06/16/25      Discharge Diet       PDMP Review         Value Time User    PDMP Reviewed  Yes 1/19/2024  1:11 PM Sidra Umaña PA-C             ED Provider  Attending physically available and evaluated Kvng Ridleyger. I managed the patient along with the ED Attending.    Electronically Signed by           Elliott Cameron DO  06/17/24 4246

## 2024-06-17 VITALS
HEART RATE: 93 BPM | RESPIRATION RATE: 18 BRPM | TEMPERATURE: 96.7 F | DIASTOLIC BLOOD PRESSURE: 77 MMHG | SYSTOLIC BLOOD PRESSURE: 140 MMHG | OXYGEN SATURATION: 93 %

## 2024-06-17 PROBLEM — I71.019 CHRONIC THORACIC AORTIC DISSECTION (HCC): Status: ACTIVE | Noted: 2024-06-17

## 2024-06-17 PROBLEM — I72.9 ANEURYSM (HCC): Status: ACTIVE | Noted: 2024-06-17

## 2024-06-17 PROBLEM — N30.01 ACUTE CYSTITIS WITH HEMATURIA: Status: ACTIVE | Noted: 2024-06-17

## 2024-06-17 PROBLEM — I25.5 ISCHEMIC CARDIOMYOPATHY: Status: ACTIVE | Noted: 2024-06-17

## 2024-06-17 PROBLEM — N13.2 HYDRONEPHROSIS WITH URETERAL CALCULUS: Status: ACTIVE | Noted: 2024-06-17

## 2024-06-17 LAB
4HR DELTA HS TROPONIN: 15 NG/L
ABO GROUP BLD: NORMAL
ABO GROUP BLD: NORMAL
ALBUMIN SERPL BCP-MCNC: 3.8 G/DL (ref 3.5–5)
ALP SERPL-CCNC: 48 U/L (ref 34–104)
ALT SERPL W P-5'-P-CCNC: 32 U/L (ref 7–52)
ANION GAP SERPL CALCULATED.3IONS-SCNC: 7 MMOL/L (ref 4–13)
APTT PPP: 25 SECONDS (ref 23–37)
AST SERPL W P-5'-P-CCNC: 27 U/L (ref 13–39)
BASOPHILS # BLD AUTO: 0.03 THOUSANDS/ÂΜL (ref 0–0.1)
BASOPHILS NFR BLD AUTO: 1 % (ref 0–1)
BILIRUB SERPL-MCNC: 0.68 MG/DL (ref 0.2–1)
BLD GP AB SCN SERPL QL: NEGATIVE
BUN SERPL-MCNC: 16 MG/DL (ref 5–25)
CALCIUM SERPL-MCNC: 8.8 MG/DL (ref 8.4–10.2)
CARDIAC TROPONIN I PNL SERPL HS: 39 NG/L
CHLORIDE SERPL-SCNC: 106 MMOL/L (ref 96–108)
CO2 SERPL-SCNC: 28 MMOL/L (ref 21–32)
CREAT SERPL-MCNC: 0.67 MG/DL (ref 0.6–1.3)
EOSINOPHIL # BLD AUTO: 0.15 THOUSAND/ÂΜL (ref 0–0.61)
EOSINOPHIL NFR BLD AUTO: 3 % (ref 0–6)
ERYTHROCYTE [DISTWIDTH] IN BLOOD BY AUTOMATED COUNT: 13.5 % (ref 11.6–15.1)
EST. AVERAGE GLUCOSE BLD GHB EST-MCNC: 163 MG/DL
GFR SERPL CREATININE-BSD FRML MDRD: 100 ML/MIN/1.73SQ M
GLUCOSE SERPL-MCNC: 131 MG/DL (ref 65–140)
GLUCOSE SERPL-MCNC: 165 MG/DL (ref 65–140)
GLUCOSE SERPL-MCNC: 238 MG/DL (ref 65–140)
HBA1C MFR BLD: 7.3 %
HCT VFR BLD AUTO: 43.5 % (ref 36.5–49.3)
HGB BLD-MCNC: 14.4 G/DL (ref 12–17)
IMM GRANULOCYTES # BLD AUTO: 0.03 THOUSAND/UL (ref 0–0.2)
IMM GRANULOCYTES NFR BLD AUTO: 1 % (ref 0–2)
INR PPP: 1.13 (ref 0.84–1.19)
LYMPHOCYTES # BLD AUTO: 1.97 THOUSANDS/ÂΜL (ref 0.6–4.47)
LYMPHOCYTES NFR BLD AUTO: 33 % (ref 14–44)
MAGNESIUM SERPL-MCNC: 1.9 MG/DL (ref 1.9–2.7)
MCH RBC QN AUTO: 33.6 PG (ref 26.8–34.3)
MCHC RBC AUTO-ENTMCNC: 33.1 G/DL (ref 31.4–37.4)
MCV RBC AUTO: 101 FL (ref 82–98)
MONOCYTES # BLD AUTO: 0.57 THOUSAND/ÂΜL (ref 0.17–1.22)
MONOCYTES NFR BLD AUTO: 10 % (ref 4–12)
NEUTROPHILS # BLD AUTO: 3.27 THOUSANDS/ÂΜL (ref 1.85–7.62)
NEUTS SEG NFR BLD AUTO: 52 % (ref 43–75)
NRBC BLD AUTO-RTO: 0 /100 WBCS
PLATELET # BLD AUTO: 144 THOUSANDS/UL (ref 149–390)
PMV BLD AUTO: 10.3 FL (ref 8.9–12.7)
POTASSIUM SERPL-SCNC: 4.2 MMOL/L (ref 3.5–5.3)
PROT SERPL-MCNC: 6.5 G/DL (ref 6.4–8.4)
PROTHROMBIN TIME: 14.4 SECONDS (ref 11.6–14.5)
RBC # BLD AUTO: 4.29 MILLION/UL (ref 3.88–5.62)
RH BLD: POSITIVE
RH BLD: POSITIVE
SODIUM SERPL-SCNC: 141 MMOL/L (ref 135–147)
SPECIMEN EXPIRATION DATE: NORMAL
WBC # BLD AUTO: 6.02 THOUSAND/UL (ref 4.31–10.16)

## 2024-06-17 PROCEDURE — 85025 COMPLETE CBC W/AUTO DIFF WBC: CPT | Performed by: INTERNAL MEDICINE

## 2024-06-17 PROCEDURE — 85730 THROMBOPLASTIN TIME PARTIAL: CPT | Performed by: INTERNAL MEDICINE

## 2024-06-17 PROCEDURE — 80053 COMPREHEN METABOLIC PANEL: CPT | Performed by: INTERNAL MEDICINE

## 2024-06-17 PROCEDURE — 99223 1ST HOSP IP/OBS HIGH 75: CPT | Performed by: INTERNAL MEDICINE

## 2024-06-17 PROCEDURE — 86850 RBC ANTIBODY SCREEN: CPT | Performed by: INTERNAL MEDICINE

## 2024-06-17 PROCEDURE — 86900 BLOOD TYPING SEROLOGIC ABO: CPT | Performed by: INTERNAL MEDICINE

## 2024-06-17 PROCEDURE — 83735 ASSAY OF MAGNESIUM: CPT | Performed by: INTERNAL MEDICINE

## 2024-06-17 PROCEDURE — 85610 PROTHROMBIN TIME: CPT | Performed by: INTERNAL MEDICINE

## 2024-06-17 PROCEDURE — 86901 BLOOD TYPING SEROLOGIC RH(D): CPT | Performed by: INTERNAL MEDICINE

## 2024-06-17 PROCEDURE — 82948 REAGENT STRIP/BLOOD GLUCOSE: CPT

## 2024-06-17 PROCEDURE — 99222 1ST HOSP IP/OBS MODERATE 55: CPT | Performed by: UROLOGY

## 2024-06-17 PROCEDURE — 83036 HEMOGLOBIN GLYCOSYLATED A1C: CPT | Performed by: INTERNAL MEDICINE

## 2024-06-17 PROCEDURE — NC001 PR NO CHARGE: Performed by: PHYSICIAN ASSISTANT

## 2024-06-17 PROCEDURE — 36415 COLL VENOUS BLD VENIPUNCTURE: CPT | Performed by: INTERNAL MEDICINE

## 2024-06-17 RX ORDER — TAMSULOSIN HYDROCHLORIDE 0.4 MG/1
0.4 CAPSULE ORAL
Qty: 30 CAPSULE | Refills: 0 | Status: SHIPPED | OUTPATIENT
Start: 2024-06-17

## 2024-06-17 RX ORDER — ASPIRIN 81 MG/1
81 TABLET, CHEWABLE ORAL DAILY
Status: DISCONTINUED | OUTPATIENT
Start: 2024-06-18 | End: 2024-06-17

## 2024-06-17 RX ORDER — CEPHALEXIN 500 MG/1
500 CAPSULE ORAL EVERY 6 HOURS SCHEDULED
Qty: 24 CAPSULE | Refills: 0 | Status: SHIPPED | OUTPATIENT
Start: 2024-06-17 | End: 2024-06-23

## 2024-06-17 RX ORDER — ASPIRIN 81 MG/1
81 TABLET, CHEWABLE ORAL DAILY
Status: DISCONTINUED | OUTPATIENT
Start: 2024-06-17 | End: 2024-06-17 | Stop reason: HOSPADM

## 2024-06-17 RX ORDER — AMLODIPINE BESYLATE 5 MG/1
5 TABLET ORAL DAILY
Start: 2024-06-17 | End: 2024-06-17

## 2024-06-17 RX ORDER — TAMSULOSIN HYDROCHLORIDE 0.4 MG/1
0.4 CAPSULE ORAL
Qty: 30 CAPSULE | Refills: 0 | Status: SHIPPED | OUTPATIENT
Start: 2024-06-17 | End: 2024-06-17

## 2024-06-17 RX ORDER — INSULIN LISPRO 100 [IU]/ML
1-5 INJECTION, SOLUTION INTRAVENOUS; SUBCUTANEOUS EVERY 6 HOURS SCHEDULED
Status: DISCONTINUED | OUTPATIENT
Start: 2024-06-17 | End: 2024-06-17 | Stop reason: HOSPADM

## 2024-06-17 RX ORDER — CEPHALEXIN 500 MG/1
500 CAPSULE ORAL EVERY 6 HOURS SCHEDULED
Qty: 24 CAPSULE | Refills: 0 | Status: SHIPPED | OUTPATIENT
Start: 2024-06-17 | End: 2024-06-17

## 2024-06-17 RX ORDER — ATORVASTATIN CALCIUM 80 MG/1
80 TABLET, FILM COATED ORAL
Status: DISCONTINUED | OUTPATIENT
Start: 2024-06-17 | End: 2024-06-17 | Stop reason: HOSPADM

## 2024-06-17 RX ORDER — ACETAMINOPHEN 325 MG/1
650 TABLET ORAL EVERY 6 HOURS PRN
Status: DISCONTINUED | OUTPATIENT
Start: 2024-06-17 | End: 2024-06-17 | Stop reason: HOSPADM

## 2024-06-17 RX ORDER — HYDROMORPHONE HCL/PF 1 MG/ML
0.5 SYRINGE (ML) INJECTION EVERY 6 HOURS PRN
Status: DISCONTINUED | OUTPATIENT
Start: 2024-06-17 | End: 2024-06-17 | Stop reason: HOSPADM

## 2024-06-17 RX ORDER — TAMSULOSIN HYDROCHLORIDE 0.4 MG/1
0.4 CAPSULE ORAL
Status: DISCONTINUED | OUTPATIENT
Start: 2024-06-17 | End: 2024-06-17 | Stop reason: HOSPADM

## 2024-06-17 RX ORDER — TAMSULOSIN HYDROCHLORIDE 0.4 MG/1
0.4 CAPSULE ORAL
Qty: 6 CAPSULE | Refills: 0 | Status: CANCELLED | OUTPATIENT
Start: 2024-06-17

## 2024-06-17 RX ORDER — SODIUM CHLORIDE, SODIUM GLUCONATE, SODIUM ACETATE, POTASSIUM CHLORIDE, MAGNESIUM CHLORIDE, SODIUM PHOSPHATE, DIBASIC, AND POTASSIUM PHOSPHATE .53; .5; .37; .037; .03; .012; .00082 G/100ML; G/100ML; G/100ML; G/100ML; G/100ML; G/100ML; G/100ML
75 INJECTION, SOLUTION INTRAVENOUS CONTINUOUS
Status: DISCONTINUED | OUTPATIENT
Start: 2024-06-17 | End: 2024-06-17 | Stop reason: HOSPADM

## 2024-06-17 RX ORDER — AMLODIPINE BESYLATE 5 MG/1
5 TABLET ORAL DAILY
Start: 2024-06-17

## 2024-06-17 RX ORDER — OXYCODONE HYDROCHLORIDE 5 MG/1
5 TABLET ORAL EVERY 6 HOURS PRN
Qty: 20 TABLET | Refills: 0 | Status: SHIPPED | OUTPATIENT
Start: 2024-06-17 | End: 2024-06-27

## 2024-06-17 RX ORDER — ATORVASTATIN CALCIUM 80 MG/1
80 TABLET, FILM COATED ORAL DAILY
Status: DISCONTINUED | OUTPATIENT
Start: 2024-06-17 | End: 2024-06-17 | Stop reason: SDUPTHER

## 2024-06-17 RX ORDER — OXYCODONE HYDROCHLORIDE 5 MG/1
5 TABLET ORAL EVERY 6 HOURS PRN
Status: DISCONTINUED | OUTPATIENT
Start: 2024-06-17 | End: 2024-06-17 | Stop reason: HOSPADM

## 2024-06-17 RX ORDER — OXYCODONE HYDROCHLORIDE 5 MG/1
5 TABLET ORAL EVERY 6 HOURS PRN
Qty: 20 TABLET | Refills: 0 | Status: SHIPPED | OUTPATIENT
Start: 2024-06-17 | End: 2024-06-17

## 2024-06-17 RX ORDER — METOPROLOL SUCCINATE 50 MG/1
50 TABLET, EXTENDED RELEASE ORAL DAILY
Status: DISCONTINUED | OUTPATIENT
Start: 2024-06-17 | End: 2024-06-17 | Stop reason: HOSPADM

## 2024-06-17 RX ORDER — NICOTINE 21 MG/24HR
1 PATCH, TRANSDERMAL 24 HOURS TRANSDERMAL DAILY
Status: DISCONTINUED | OUTPATIENT
Start: 2024-06-17 | End: 2024-06-17 | Stop reason: HOSPADM

## 2024-06-17 RX ADMIN — SODIUM CHLORIDE, SODIUM GLUCONATE, SODIUM ACETATE, POTASSIUM CHLORIDE, MAGNESIUM CHLORIDE, SODIUM PHOSPHATE, DIBASIC, AND POTASSIUM PHOSPHATE 75 ML/HR: .53; .5; .37; .037; .03; .012; .00082 INJECTION, SOLUTION INTRAVENOUS at 01:53

## 2024-06-17 RX ADMIN — ASPIRIN 81 MG CHEWABLE TABLET 81 MG: 81 TABLET CHEWABLE at 10:25

## 2024-06-17 RX ADMIN — METOPROLOL SUCCINATE 50 MG: 50 TABLET, EXTENDED RELEASE ORAL at 10:25

## 2024-06-17 RX ADMIN — HYDROMORPHONE HYDROCHLORIDE 0.5 MG: 1 INJECTION, SOLUTION INTRAMUSCULAR; INTRAVENOUS; SUBCUTANEOUS at 01:52

## 2024-06-17 RX ADMIN — OXYCODONE HYDROCHLORIDE 5 MG: 5 TABLET ORAL at 05:16

## 2024-06-17 RX ADMIN — INSULIN LISPRO 2 UNITS: 100 INJECTION, SOLUTION INTRAVENOUS; SUBCUTANEOUS at 01:59

## 2024-06-17 NOTE — PROGRESS NOTES
[facility]  Post-Admission Follow-up  Name: Kvng Bynum I  MRN: [id number]  Unit/Bed#: ED 06 I Date of Admission: 6/16/2024   Date of Service: 6/17/2024 I Hospital Day: 1    This is a postadmission follow-up note.  I agree with the admitting providers documentation with the additional notes:    Primary diagnosis: Hydronephrosis secondary to ureteral calculus  Continue IV ceftriaxone  Await urology evaluation  Continue n.p.o. status in anticipation of possible stent placement  Continue IVF  Continue pain control      VTE Pharmacologic Prophylaxis: VTE Score: 3 Moderate Risk (Score 3-4) - Pharmacological DVT Prophylaxis Contraindicated. Sequential Compression Devices Ordered.      Patient Centered Rounds: I performed bedside rounds with nursing staff today.   Discussions with Specialists or Other Care Team Provider: None    Education and Discussions with Family / Patient: Patient declined call to .     Total Time Spent on Date of Encounter in care of patient: 25 mins. This time was spent on one or more of the following: performing physical exam; counseling and coordination of care; obtaining or reviewing history; documenting in the medical record; reviewing/ordering tests, medications or procedures; communicating with other healthcare professionals and discussing with patient's family/caregivers.    Current Length of Stay: 1 day(s)  Current Patient Status: Inpatient   Certification Statement: The patient will continue to require additional inpatient hospital stay due to pain control, on IV fluids, awaiting urology recommendations and possible intervention  Discharge Plan: Anticipate discharge in 48-72 hrs to home.    Code Status: Level 1 - Full Code    Subjective:   Patient reports he just has a mild soreness in his right flank.  We discussed the possibility of urologic intervention.  He is hopeful to not need a stent.    Objective:     Vitals:   Temp (24hrs),  Av.7 °F (35.9 °C), Min:96.7 °F (35.9 °C), Max:96.7 °F (35.9 °C)    Temp:  [96.7 °F (35.9 °C)] 96.7 °F (35.9 °C)  HR:  [53-79] 79  Resp:  [18-22] 18  BP: (119-170)/() 131/72  SpO2:  [91 %-100 %] 93 %  There is no height or weight on file to calculate BMI.     Input and Output Summary (last 24 hours):     Intake/Output Summary (Last 24 hours) at 2024 0824  Last data filed at 2024 2345  Gross per 24 hour   Intake 50 ml   Output --   Net 50 ml       Physical Exam:   Physical Exam  Vitals and nursing note reviewed.   Constitutional:       General: He is not in acute distress.     Appearance: He is well-developed.   HENT:      Head: Normocephalic and atraumatic.   Eyes:      General: No scleral icterus.     Conjunctiva/sclera: Conjunctivae normal.   Cardiovascular:      Rate and Rhythm: Normal rate and regular rhythm.      Heart sounds: No murmur heard.  Pulmonary:      Effort: Pulmonary effort is normal. No respiratory distress.      Breath sounds: Normal breath sounds. No wheezing or rales.   Abdominal:      General: There is no distension.      Palpations: Abdomen is soft.      Tenderness: There is no abdominal tenderness.   Musculoskeletal:         General: No edema.   Skin:     General: Skin is warm and dry.      Findings: No erythema.   Neurological:      Mental Status: He is alert.   Psychiatric:         Mood and Affect: Mood and affect normal.         Additional Data:     Labs:  Results from last 7 days   Lab Units 24  0513   WBC Thousand/uL 6.02   HEMOGLOBIN g/dL 14.4   HEMATOCRIT % 43.5   PLATELETS Thousands/uL 144*   SEGS PCT % 52   LYMPHO PCT % 33   MONO PCT % 10   EOS PCT % 3     Results from last 7 days   Lab Units 24  0513   SODIUM mmol/L 141   POTASSIUM mmol/L 4.2   CHLORIDE mmol/L 106   CO2 mmol/L 28   BUN mg/dL 16   CREATININE mg/dL 0.67   ANION GAP mmol/L 7   CALCIUM mg/dL 8.8   ALBUMIN g/dL 3.8   TOTAL BILIRUBIN mg/dL 0.68   ALK PHOS U/L 48   ALT U/L 32   AST U/L 27    GLUCOSE RANDOM mg/dL 131     Results from last 7 days   Lab Units 06/17/24  0513   INR  1.13     Results from last 7 days   Lab Units 06/17/24  0150   POC GLUCOSE mg/dl 238*     Results from last 7 days   Lab Units 06/17/24  0513   HEMOGLOBIN A1C % 7.3*           Lines/Drains:  Invasive Devices       Peripheral Intravenous Line  Duration             Peripheral IV 06/16/24 Right Antecubital <1 day                          Imaging: Reviewed radiology reports from this admission including: chest xray and abdominal/pelvic CT    Recent Cultures (last 7 days):         Last 24 Hours Medication List:   Current Facility-Administered Medications   Medication Dose Route Frequency Provider Last Rate    acetaminophen  650 mg Oral Q6H PRN Ronnie Cruz, DO      aspirin  81 mg Oral Daily Ronnie Cruz, DO      atorvastatin  80 mg Oral Daily With Dinner Ronnie Cruz, DO      cefTRIAXone  1,000 mg Intravenous Q24H Ronnie Cruz, DO      cyanocobalamin  100 mcg Oral Daily Ronnie Cruz, DO      HYDROmorphone  0.5 mg Intravenous Q6H PRN Ronnie Cruz, DO      insulin lispro  1-5 Units Subcutaneous Q6H [facility] Ronnie Cruz, DO      metoprolol succinate  50 mg Oral Daily Ronnie Cruz, DO      multi-electrolyte  75 mL/hr Intravenous Continuous Ronnie Cruz DO 75 mL/hr (06/17/24 0153)    nicotine  1 patch Transdermal Daily Ronnie Cruz, DO      oxyCODONE  5 mg Oral Q6H PRN Ronnie Cruz, DO      tamsulosin  0.4 mg Oral Daily With Dinner Ronnie Cruz DO          Today, Patient Was Seen By: Bree Flores PA-C    **Please Note: This note may have been constructed using a voice recognition system.**

## 2024-06-17 NOTE — ASSESSMENT & PLAN NOTE
UA with evidence of infection as well as hematuria  In setting of obstructing ureteral calculi  Ceftriaxone while admitted - Urology recommending discharge on Keflex to complete course

## 2024-06-17 NOTE — CONSULTS
Consult - Urology   Kvng Bynum 1955, 68 y.o. male MRN: [id number]    Unit/Bed#: ED 06 Encounter: [id number]    Assessment and plan    2 small distal ureteral calculi, 1 of which is passed into the bladder ready at the time of imaging and 1 other remaining in the distalmost portion of the right ureter with some upstream right hydroureteronephrosis.  In the past 12 hours since the scan he has had the urethral sensation of passing at least one of the stones.  He is completely pain-free the last several hours and has no further hematuria.  He has no signs or symptoms of systemic infection nor urinary tract infection.  As such I think medical expulsive therapy is very reasonable, no plan for operative stent today.  I recommend course of tamsulosin and an empiric coverage with Keflex over the next 6 days since there was some bacteriuria on the urinalysis, and the culture is still pending.  He knows he must return to the hospital if he develops fevers, rigors, progressive pain or vomiting.                   Subjective:   Patient is a 68 year old male with PMH of CAD, CHF, DM, HTN, and stones who presented to ED yesterday evening 6/16 for flank pain and hematuria. He noticed hematuria first around 4 pm yesterday and then began to have flank pain. Reported to ED 2 hours later. He took pain pill with codeine at home yesterday with no relief. This morning he felt a sharp pain at the tip of his penis while voiding which he believes could have been a stone. He has passed stones at home before and states that this episode of penile pain while urinating felt the same as prior. Denies current flank and suprapubic pain, hematuria, fever, chills, n/v, abdominal pain. Would like to go home to eat and pass second stone.     Review of Systems   Constitutional:  Negative for chills, fatigue and fever.   Gastrointestinal:  Negative for abdominal pain, nausea and vomiting.   Genitourinary:  Negative for difficulty urinating,  dysuria, flank pain, hematuria and urgency.           Objective:  Vitals: Blood pressure 131/72, pulse 79, temperature (!) 96.7 °F (35.9 °C), temperature source Temporal, resp. rate 18, SpO2 93%.,There is no height or weight on file to calculate BMI.    Physical Exam  Constitutional:       General: He is not in acute distress.     Appearance: Normal appearance. He is not ill-appearing.   Cardiovascular:      Rate and Rhythm: Normal rate and regular rhythm.   Pulmonary:      Effort: Pulmonary effort is normal.   Abdominal:      General: Abdomen is flat. Bowel sounds are normal. There is no distension.      Palpations: Abdomen is soft.      Tenderness: There is no abdominal tenderness.   Neurological:      Mental Status: He is alert.           Imaging:    CT abdomen pelvis wo contrast [[id number]] Collected: 06/16/24 2107   Order Status: Completed Updated: 06/16/24 2119   Narrative:     CT ABDOMEN AND PELVIS WITHOUT IV CONTRAST    INDICATION: R CVA tenderness, hx of AAA 5 cm, Hx kidney stones.    COMPARISON: CT abdomen/pelvis from 3/6/2013.    TECHNIQUE: CT examination of the abdomen and pelvis was performed without intravenous contrast. Multiplanar 2D reformatted images were created from the source data.    This examination, like all CT scans performed in the [facility], was performed utilizing techniques to minimize radiation dose exposure, including the use of iterative reconstruction and automated exposure control. Radiation dose length  product (DLP) for this visit: 1450.59 mGy-cm    Enteric Contrast: Not administered.    FINDINGS:    ABDOMEN    LOWER CHEST: No pleural or pericardial effusion. Mild cardiomegaly .    LIVER/BILIARY TREE: Stable left lobe cyst measuring 2.8 cm.    GALLBLADDER: Removed.    SPLEEN: Unremarkable.    PANCREAS: Unremarkable.    ADRENAL GLANDS: Unremarkable.    KIDNEYS/URETERS: Mild right hydroureteronephrosis with a 2 mm calculus in the distal right ureter image 2/140 and  another 2 mm calculus in the midline of the bladder dependently likely representing a recently passed calculus. Additional nonobstructing  intrarenal calculi measuring up to 4 mm. Nonobstructing right renal calculus measuring 5 mm. Simple right renal cyst measuring 3.7 cm and left renal cysts measuring 1.5 cm and 4.6 cm.    STOMACH AND BOWEL: Unremarkable.    APPENDIX: Normal.    ABDOMINOPELVIC CAVITY: No ascites. No pneumoperitoneum. No lymphadenopathy.    VESSELS: Multiple areas of vascular aneurysm for example descending thoracic aorta aneurysm measures 6.1 cm at the diaphragmatic hiatus (previous 4.6 cm). 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 Aneurysm of the common hepatic  artery measures 3.9 cm (previous 2.5 cm). Aneurysm of the left common iliac artery measures 2.6 cm (previous 2.1 cm). Calcified dissection flap noted in the abdominal aorta consistent with chronic dissection.    PELVIS    REPRODUCTIVE ORGANS: Enlarged prostate    URINARY BLADDER: Dependent 2 mm calculus.    ABDOMINAL WALL/INGUINAL REGIONS: Unremarkable.    BONES: No acute fracture or suspicious osseous lesion.   Impression:       Right flank pain can be explained by 2 mm calculus in the midline of the bladder that likely recently passed as well as a distal right ureteral calculus measuring 2 mm resulting in hydronephrosis.    Bilateral nonobstructing intrarenal calculi as described.    Stable cardiomegaly.    Multiple areas of vascular aneurysm for example descending thoracic aorta aneurysm measures 6.1 cm at the diaphragmatic hiatus (previous 4.6 cm).    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    Aneurysm of the common hepatic artery measures 3.9 cm (previous 2.5 cm).    Aneurysm of the left common iliac artery measures 2.6 cm (previous 2.1 cm).    Calcified dissection flap noted in the abdominal aorta consistent with chronic dissection.     Imaging reviewed - both report and images  personally reviewed.     Labs:  Recent Labs     06/16/24 1948 06/17/24  0513   WBC 6.80 6.02     Recent Labs     06/16/24 1948 06/17/24  0513   HGB 15.1 14.4       Recent Labs     06/16/24 1948 06/17/24  0513   CREATININE 0.77 0.67       Microbiology:      Component  Ref Range & Units 6/16/24 2153   RBC, UA  None Seen, 1-2 /hpf Innumerable Abnormal    WBC, UA  None Seen, 1-2 /hpf 20-30 Abnormal    Epithelial Cells  None Seen, Occasional /hpf None Seen   Bacteria, UA  None Seen, Occasional /hpf Moderate Abnormal    MUCUS THREADS  None Seen Innumerable Abnormal    Amorphous Crystals, UA Occasional          History:  Social History     Socioeconomic History    Marital status: Single     Spouse name: None    Number of children: None    Years of education: None    Highest education level: None   Occupational History    None   Tobacco Use    Smoking status: Some Days    Smokeless tobacco: Never    Tobacco comments:     1 OR 2 CIGS DAILY   Substance and Sexual Activity    Alcohol use: Yes     Comment: 3X/WEEK 6 [employer]    Drug use: No    Sexual activity: None   Other Topics Concern    None   Social History Narrative    None     Social Determinants of Health     Financial Resource Strain: Not on file   Food Insecurity: Not on file   Transportation Needs: Not on file   Physical Activity: Not on file   Stress: Not on file   Social Connections: Not on file   Intimate Partner Violence: Not on file   Housing Stability: Not on file       Past Medical History:   Diagnosis Date    Aortic disease ([facility])     CAD (coronary artery disease) 1/18/2024    CHF (congestive heart failure) ([facility]) 1/18/2024    Diabetes mellitus ([facility]) 1/18/2024    Hyperlipidemia 1/18/2024    Hypertension 1/18/2024    ICD (implantable 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 1/18/2024    Mitral regurgitation     Myocardial infarction ([facility]) 1/18/2024    Valvular heart disease      Past Surgical History:   Procedure Laterality Date    CARDIAC ELECTROPHYSIOLOGY  PROCEDURE N/A 1/19/2024    Procedure: Cardiac icd generator change and DEVICE UPGRADE TO DC ICD;  Surgeon: Kobi Kitchen MD;  Location: [facility];  Service: Cardiology    COLONOSCOPY      ESOPHAGOGASTRODUODENOSCOPY      INSERT / REPLACE / REMOVE PACEMAKER      IR UPPER EXTREMITY VENOGRAM- DIAGNOSTIC  12/22/2023    MA EDG US EXAM SURGICAL ALTER STOM DUODENUM/JEJUNUM N/A 12/26/2018    Procedure: RADIAL ENDOSCOPIC U/S WITH POLYPECTOMY;  Surgeon: Bob Andrea MD;  Location: [facility] GI [facility];  Service: Gastroenterology    TONSILLECTOMY       History reviewed. No pertinent family history.    Kacie Seaman PA-C  Date: 6/17/2024 Time: 9:59 AM

## 2024-06-17 NOTE — ASSESSMENT & PLAN NOTE
History of cardiac arrest in 2017 secondary to coronary artery disease for which an LAD stent was placed as well as ICD   Denies any chest pain  Continue PTA aspirin, Toprol and statin  Held PTA lisinopril as well as Norvasc in setting of UTI as well as obstructing ureteral calculi  Resume on discharge as patient has passed stone

## 2024-06-17 NOTE — ASSESSMENT & PLAN NOTE
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 Ananth Arias with [facility] vascular surgery for these issues over at least the last 2 years from review of notes in care everywhere   Prior provider reviewed Dr. Arias's note from 3/28/2023 and he reports that he would recommend repair of the aneurysm when it is 6.5cm.  Dr. Arias additionally noted continued monitoring of his chronic aortic dissection as well as other aneurysms with yearly surveillance and monitoring for symptoms  Patient had his CT angio for monitoring on 5/8/2024 which I reviewed from care everywhere and which noted in the impression: \" Mild aneurysm of the ascending aorta.  Large aneurysm is fusiform of the abdominal aorta with chronic dissection extending from the upper abdominal aorta to the aortic bifurcation.  Extensive thrombus in the aortic aneurysm.  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  Large aneurysm of the hepatic artery with extensive thrombus.  Extensive thrombus with significant stenosis of the splenic artery.  Left common iliac artery aneurysm.  No evidence of a leak.  No significant change compared to last exam.  No evidence of pulmonary embolism\"; radiologist reading study compared 5/8/2024 study with study from 3/15/2023  Patient also reports that he saw Dr. Arias in the office on 5/14/24 regarding the study and that Dr. Arias recommended continued monitoring.  I did inform patient that I was unable to see the documentation from Dr. Arias it is note but patient reported \"Oh I was there.\"  Noted CT imaging today which notes right flank pain \"can be explained by 2 mm calculus in the midline of the bladder that likely recently passed as well as a " "distal right ureteral calculus measuring 2 mm resulting in hydronephrosis\" but also does note patient's multiple areas of vascular aneurysm as well as chronic dissection  On exam clinically, patient's symptoms are right flank pain that radiates to the right lower quadrant of the abdomen that comes and goes in waves and he reports that this feels exactly similar to the pain he has had with previous kidney stones; denies any chest pain.  As such, clinically would agree that patient's symptoms can be explained by the ureteral calculi.   "

## 2024-06-17 NOTE — ASSESSMENT & PLAN NOTE
History of ischemic cardiomyopathy with last echocardiogram in our system from 2014 with LVEF 20-25% per Dr. Kitchen note with cardiology in 2023  On PTA aspirin, Toprol, rosuvastatin, lisinopril, and eplerenone  Continue aspirin, Toprol and rosuvastatin  Held lisinopril as well as eplerenone in setting of UTI as well as obstructing ureteral calculi  Can resume on discharge as patient has passed stone

## 2024-06-17 NOTE — ED ATTENDING ATTESTATION
6/16/2024  I, Darshan Goins MD, saw and evaluated the patient. I have discussed the patient with the resident/non-physician practitioner and agree with the resident's/non-physician practitioner's findings, Plan of Care, and MDM as documented in the resident's/non-physician practitioner's note, except where noted. All available labs and Radiology studies were reviewed.  I was present for key portions of any procedure(s) performed by the resident/non-physician practitioner and I was immediately available to provide assistance.       At this point I agree with the current assessment done in the Emergency Department.  I have conducted an independent evaluation of this patient a history and physical is as follows:    68-year-old man with history of multiple medical problems presenting with right lower quadrant abdominal pain right flank pain.  Also has dysuria and had an episode of hematuria this morning.  No fever.  No chest pain or shortness of breath.  On initial exam patient awake and alert, uncomfortable appearing.  He is diaphoretic.  Heart regular rate and rhythm, no murmurs rubs or gallops.  Lungs clear to auscultation bilaterally.  Abdomen is soft and distended.  There is no tenderness.  No CVA tenderness.  No focal neurological deficit.  EKG, labs and imaging done.  Patient started on antibiotics and admitted.  Given findings of worsening descending thoracic aortic aneurysm, I also placed a stat outpatient referral for vascular surgery.    ED Course         Critical Care Time  Procedures

## 2024-06-17 NOTE — ASSESSMENT & PLAN NOTE
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 Ananth Arias with [facility] vascular surgery for these issues over at least the last 2 years from review of notes in care everywhere   Reviewed Dr. Arias's note from 3/28/2023 and he reports that he would recommend repair of the aneurysm when it is 6.5cm.  Dr. Arias additionally noted continued monitoring of his chronic aortic dissection as well as other aneurysms with yearly surveillance and monitoring for symptoms  Patient had his CT angio for monitoring on 5/8/2024 which I reviewed from care everywhere and which noted in the impression: \" Mild aneurysm of the ascending aorta.  Large aneurysm is fusiform of the abdominal aorta with chronic dissection extending from the upper abdominal aorta to the aortic bifurcation.  Extensive thrombus in the aortic aneurysm.  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  Large aneurysm of the hepatic artery with extensive thrombus.  Extensive thrombus with significant stenosis of the splenic artery.  Left common iliac artery aneurysm.  No evidence of a leak.  No significant change compared to last exam.  No evidence of pulmonary embolism\"; radiologist reading study compared 5/8/2024 study with study from 3/15/2023  Patient also reports that he saw Dr. Arias in the office on 5/14/24 regarding the study and that Dr. Arias recommended continued monitoring.  I did inform patient that I was unable to see the documentation from Dr. Arias it is note but patient reported \"Oh I was there.\"  Noted CT imaging today which notes right flank pain \"can be explained by 2 mm calculus in the midline of the bladder that likely recently passed as well as a distal right " "ureteral calculus measuring 2 mm resulting in hydronephrosis\" but also does note patient's multiple areas of vascular aneurysm as well as chronic dissection  On exam clinically, patient's symptoms are right flank pain that radiates to the right lower quadrant of the abdomen that comes and goes in waves and he reports that this feels exactly similar to the pain he has had with previous kidney stones; denies any chest pain.  As such, clinically would agree that patient's symptoms can be explained by the ureteral calculi.   "

## 2024-06-17 NOTE — DISCHARGE INSTRUCTIONS
It is very important that you follow-up with vascular surgery in the next few days regarding your thoracic aortic aneurysm.  It has increased in size to 6.1 cm.  This is at very high risk for rupture which you could die from.  We have placed a referral and they may call you tomorrow, however if you do not hear from them by tomorrow afternoon you should call them to arrange the appointment.  Please follow-up with your primary care doctor as well.  Please return to the ER right away if you are having chest pain, shortness of breath, dizziness or lightheadedness, new or worsening abdominal pain, new or worsening back pain, or if you have any other concerns.

## 2024-06-17 NOTE — ASSESSMENT & PLAN NOTE
UA with evidence of infection as well as hematuria  In setting of obstructing ureteral calculi  Ceftriaxone and follow-up urine culture results

## 2024-06-17 NOTE — H&P
[facility]  H&P  Name: Kvng Bynum 68 y.o. male I MRN: [id number]  Unit/Bed#: ED 06 I Date of Admission: 6/16/2024   Date of Service: 6/17/2024 I Hospital Day: 1      Assessment & Plan   * Hydronephrosis with ureteral calculus  Assessment & Plan  Right flank pain that comes and goes in waves which began last night as well as dysuria and hematuria  Afebrile, no leukocytosis  UA with evidence of pyuria as well as hematuria  Creatinine within normal limits  CT abdomen pelvis with impression that notes right flank pain can be explained by 2 mm calculus in the midline of the bladder that likely recently passed as well as a distal right ureteral calculus measuring 2 mm resulting in hydronephrosis.   CT additionally does note multiple areas of vascular abnormalities (see aneursym section and chronic aortic dissection sections for details)  Admit to medicine.  Start IV ceftriaxone.  Keep n.p.o. except for medications.  Consult urology for potential need for ureteral stent placement pending their evaluation in AM.  IV fluids.  Pain control.    Acute cystitis with hematuria  Assessment & Plan  UA with evidence of infection as well as hematuria  In setting of obstructing ureteral calculi  Ceftriaxone and follow-up urine culture results    Ischemic cardiomyopathy  Assessment & Plan  History of ischemic cardiomyopathy with last echocardiogram in our system from 2014 with LVEF 20-25% per Dr. Kitchen note with cardiology in 2023  On PTA aspirin, Toprol, rosuvastatin, lisinopril, and eplerenone  Continue aspirin, Toprol and rosuvastatin  Hold lisinopril as well as eplerenone in setting of UTI as well as obstructing ureteral calculi    Aneurysm (HCC)  Assessment & Plan  Patient has known chronic type B aortic dissection which extends from the upper abdominal aorta to the aortic bifurcation as well as thoracic aortic aneurysm  Patient additionally has known aneurysms of the celiac artery,  "common hepatic artery and left common iliac artery  Patient follows with Dr. Ananth Arias with [facility] vascular surgery for these issues over at least the last 2 years from review of notes in care everywhere   Reviewed Dr. Arias's note from 3/28/2023 and he reports that he would recommend repair of the aneurysm when it is 6.5cm.  Dr. Arias additionally noted continued monitoring of his chronic aortic dissection as well as other aneurysms with yearly surveillance and monitoring for symptoms  Patient had his CT angio for monitoring on 5/8/2024 which I reviewed from care everywhere and which noted in the impression: \" Mild aneurysm of the ascending aorta.  Large aneurysm is fusiform of the abdominal aorta with chronic dissection extending from the upper abdominal aorta to the aortic bifurcation.  Extensive thrombus in the aortic aneurysm.  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  Large aneurysm of the hepatic artery with extensive thrombus.  Extensive thrombus with significant stenosis of the splenic artery.  Left common iliac artery aneurysm.  No evidence of a leak.  No significant change compared to last exam.  No evidence of pulmonary embolism\"; radiologist reading study compared 5/8/2024 study with study from 3/15/2023  Patient also reports that he saw Dr. Arias in the office on 5/14/24 regarding the study and that Dr. Arias recommended continued monitoring.  I did inform patient that I was unable to see the documentation from Dr. Arias it is note but patient reported \"Oh I was there.\"  Noted CT imaging today which notes right flank pain \"can be explained by 2 mm calculus in the midline of the bladder that likely recently passed as well as a distal right ureteral calculus measuring 2 mm resulting in hydronephrosis\" but also does note patient's multiple areas of vascular aneurysm as well as chronic dissection  On exam clinically, patient's symptoms are right flank pain that " "radiates to the right lower quadrant of the abdomen that comes and goes in waves and he reports that this feels exactly similar to the pain he has had with previous kidney stones; denies any chest pain.  As such, clinically would agree that patient's symptoms can be explained by the ureteral calculi.     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 Ananth Arias with [facility] vascular surgery for these issues over at least the last 2 years from review of notes in care everywhere   Reviewed Dr. Arias's note from 3/28/2023 and he reports that he would recommend repair of the aneurysm when it is 6.5cm.  Dr. Arias additionally noted continued monitoring of his chronic aortic dissection as well as other aneurysms with yearly surveillance and monitoring for symptoms  Patient had his CT angio for monitoring on 5/8/2024 which I reviewed from care everywhere and which noted in the impression: \" Mild aneurysm of the ascending aorta.  Large aneurysm is fusiform of the abdominal aorta with chronic dissection extending from the upper abdominal aorta to the aortic bifurcation.  Extensive thrombus in the aortic aneurysm.  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  Large aneurysm of the hepatic artery with extensive thrombus.  Extensive thrombus with significant stenosis of the splenic artery.  Left common iliac artery aneurysm.  No evidence of a leak.  No significant change compared to last exam.  No evidence of pulmonary embolism\"; radiologist reading study compared 5/8/2024 study with study from 3/15/2023  Patient also reports that he saw Dr. Arias in the office on 5/14/24 regarding the study " "and that Dr. Arias recommended continued monitoring.  I did inform patient that I was unable to see the documentation from Dr. Arias it is note but patient reported \"Oh I was there.\"  Noted CT imaging today which notes right flank pain \"can be explained by 2 mm calculus in the midline of the bladder that likely recently passed as well as a distal right ureteral calculus measuring 2 mm resulting in hydronephrosis\" but also does note patient's multiple areas of vascular aneurysm as well as chronic dissection  On exam clinically, patient's symptoms are right flank pain that radiates to the right lower quadrant of the abdomen that comes and goes in waves and he reports that this feels exactly similar to the pain he has had with previous kidney stones; denies any chest pain.  As such, clinically would agree that patient's symptoms can be explained by the ureteral calculi.     Diabetes mellitus (HCC)  Assessment & Plan  Lab Results   Component Value Date    HGBA1C 5.8 (H) 01/27/2014       No results for input(s): \"POCGLU\" in the last 72 hours.    Blood Sugar Average: Last 72 hrs:    On PTA metformin   Will use sliding scale insulin while hospitalized    CAD (coronary artery disease)  Assessment & Plan  History of cardiac arrest in 2017 secondary to coronary artery disease for which an LAD stent was placed as well as ICD   Denies any chest pain  Continue PTA aspirin, Toprol and statin  Holding PTA lisinopril as well as Norvasc in setting of UTI as well as obstructing ureteral calculi    ICD (implantable 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  Assessment & Plan  History of cardiac arrest in 2017 secondary to coronary artery disease for which an LAD stent was placed as well as ICD   Denies any chest pain               VTE Prophylaxis: sequential compression device and foot pump applied   Code Status: Level 1 - Full Code       Anticipated Length of Stay:  Patient will be admitted on an Inpatient basis with an anticipated " length of stay of  > 2 midnights.   Justification for Hospital Stay: Please see detailed plans noted above.    Chief Complaint:     UTI, obstructing ureteral calculi  History of Present Illness:  Kvng Bynum is a 68 y.o. male who has past medical history significant for coronary artery disease, ischemic cardiomyopathy, ICD, chronic type B aortic dissection, thoracoabdominal aortic aneurysm, multiple other areas of vascular aneurysm, nephrolithiasis who presented to [facility] ER on the evening of 6/16 with symptoms of right-sided flank pain that began last night which he reports comes and goes in severe waves as well as hematuria and dysuria.  Patient reports he has had kidney stones in the past and these symptoms feel exactly similar.  Patient denies any chest pain or shortness of breath.  Patient denies any fevers.      Review of Systems:    Constitutional:  Denies fever or chills   Eyes:  Denies change in visual acuity   HENT:  Denies nasal congestion or sore throat   Respiratory:  Denies cough or shortness of breath   Cardiovascular:  Denies chest pain or edema   GI:  Denies bloody stools  :  Denies dysuria   Musculoskeletal: Positive for right flank pain that comes and goes in waves  Integument:  Denies rash   Neurologic:  Denies headache or sensory changes   Endocrine:  Denies polyuria or polydipsia   Lymphatic:  Denies swollen glands   Psychiatric:  Denies depression or anxiety     Past Medical and Surgical History:   Past Medical History:   Diagnosis Date    Aortic disease (HCC)     CAD (coronary artery disease) 1/18/2024    CHF (congestive heart failure) (HCC) 1/18/2024    Diabetes mellitus (HCC) 1/18/2024    Hyperlipidemia 1/18/2024    Hypertension 1/18/2024    ICD (implantable 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 1/18/2024    Mitral regurgitation     Myocardial infarction (HCC) 1/18/2024    Valvular heart disease      Past Surgical History:   Procedure Laterality Date    CARDIAC  ELECTROPHYSIOLOGY PROCEDURE N/A 1/19/2024    Procedure: Cardiac icd generator change and DEVICE UPGRADE TO DC ICD;  Surgeon: Kobi Kitchen MD;  Location: [facility] CARDIAC [facility];  Service: Cardiology    COLONOSCOPY      ESOPHAGOGASTRODUODENOSCOPY      INSERT / REPLACE / REMOVE PACEMAKER      IR UPPER EXTREMITY VENOGRAM- DIAGNOSTIC  12/22/2023    AR EDG US EXAM SURGICAL ALTER STOM DUODENUM/JEJUNUM N/A 12/26/2018    Procedure: RADIAL ENDOSCOPIC U/S WITH POLYPECTOMY;  Surgeon: Bob Andrea MD;  Location: [facility] GI LAB;  Service: Gastroenterology    TONSILLECTOMY         Meds/Allergies:  (Not in a hospital admission)      Allergies: No Known Allergies    History:  Marital Status: Single     Substance Use History:   Social History     Substance and Sexual Activity   Alcohol Use Yes    Comment: 3X/WEEK 6 PACK BEER     Social History     Tobacco Use   Smoking Status Some Days   Smokeless Tobacco Never   Tobacco Comments    1 OR 2 CIGS DAILY     Social History     Substance and Sexual Activity   Drug Use No       Family History:  History reviewed. No pertinent family history.    Physical Exam:     Vitals:   Blood Pressure: 119/59 (06/17/24 0130)  Pulse: 70 (06/17/24 0130)  Temperature: (!) 96.7 °F (35.9 °C) (06/16/24 1919)  Temp Source: Temporal (06/16/24 1919)  Respirations: 20 (06/17/24 0130)  SpO2: 93 % (06/17/24 0130)    Constitutional:  Non-toxic appearance  Eyes:  EOMI, No scleral icterus   HENT:   oropharynx moist, external ears normal, external nose normal   Respiratory:  No respiratory distress, no wheezing   Cardiovascular:  Normal rate, no murmurs   GI:  Soft, no guarding   :  R CVA tenderness   Musculoskeletal: R CVA tenderness  Integument:  no jaundice, no rash   Neurologic:  Alert &awake, communicative, CN 2-12 normal,  no focal deficits noted   Psychiatric:  Speech and behavior appropriate       Lab Results: I have personally reviewed pertinent reports.      Results from last 7 days   Lab Units 06/16/24 1948   WBC  Thousand/uL 6.80   HEMOGLOBIN g/dL 15.1   HEMATOCRIT % 44.9   PLATELETS Thousands/uL 142*   SEGS PCT % 53   LYMPHO PCT % 35   MONO PCT % 8   EOS PCT % 3     Results from last 7 days   Lab Units 06/16/24  1948   POTASSIUM mmol/L 4.1   CHLORIDE mmol/L 105   CO2 mmol/L 26   BUN mg/dL 18   CREATININE mg/dL 0.77   CALCIUM mg/dL 9.5   ALK PHOS U/L 60   ALT U/L 36   AST U/L 32             Imaging: I have personally reviewed pertinent reports.   I have additionally reviewed images from [facility] system as well as [facility] network imaging    XR chest 2 views    Result Date: 6/16/2024  Narrative: XR CHEST PA & LATERAL DUAL ENERGY SUBTRACTION. INDICATION:  shortness of breath. COMPARISON: CXR 1/19/2024, abdomen CT 6/16/2024, chest CT 5/13/2015. FINDINGS: Clear lungs. No pneumothorax or pleural effusion. Mild cardiomegaly with left subclavian pacemaker leads in the posterior right atrium and right ventricular septum.. Coronary stent. Bones are unremarkable for age. Unremarkable upper abdomen.     Impression: No acute cardiopulmonary disease. Workstation performed: [id number]     CT abdomen pelvis wo contrast    Result Date: 6/16/2024  Narrative: CT ABDOMEN AND PELVIS WITHOUT IV CONTRAST INDICATION: R CVA tenderness, hx of AAA 5 cm, Hx kidney stones. COMPARISON: CT abdomen/pelvis from 3/6/2013. TECHNIQUE: CT examination of the abdomen and pelvis was performed without intravenous contrast. Multiplanar 2D reformatted images were created from the source data. This examination, like all CT scans performed in the [facility], was performed utilizing techniques to minimize radiation dose exposure, including the use of iterative reconstruction and automated exposure control. Radiation dose length product (DLP) for this visit: 1450.59 mGy-cm Enteric Contrast: Not administered. FINDINGS: ABDOMEN LOWER CHEST: No pleural or pericardial effusion. Mild cardiomegaly . LIVER/BILIARY TREE: Stable left lobe cyst measuring 2.8 cm.  GALLBLADDER: Removed. SPLEEN: Unremarkable. PANCREAS: Unremarkable. ADRENAL GLANDS: Unremarkable. KIDNEYS/URETERS: Mild right hydroureteronephrosis with a 2 mm calculus in the distal right ureter image 2/140 and another 2 mm calculus in the midline of the bladder dependently likely representing a recently passed calculus. Additional nonobstructing intrarenal calculi measuring up to 4 mm. Nonobstructing right renal calculus measuring 5 mm. Simple right renal cyst measuring 3.7 cm and left renal cysts measuring 1.5 cm and 4.6 cm. STOMACH AND BOWEL: Unremarkable. APPENDIX: Normal. ABDOMINOPELVIC CAVITY: No ascites. No pneumoperitoneum. No lymphadenopathy. VESSELS: Multiple areas of vascular aneurysm for example descending thoracic aorta aneurysm measures 6.1 cm at the diaphragmatic hiatus (previous 4.6 cm). 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 Aneurysm of the common hepatic artery measures 3.9 cm (previous 2.5 cm). Aneurysm of the left common iliac artery measures 2.6 cm (previous 2.1 cm). Calcified dissection flap noted in the abdominal aorta consistent with chronic dissection. PELVIS REPRODUCTIVE ORGANS: Enlarged prostate URINARY BLADDER: Dependent 2 mm calculus. ABDOMINAL WALL/INGUINAL REGIONS: Unremarkable. BONES: No acute fracture or suspicious osseous lesion.     Impression: Right flank pain can be explained by 2 mm calculus in the midline of the bladder that likely recently passed as well as a distal right ureteral calculus measuring 2 mm resulting in hydronephrosis. Bilateral nonobstructing intrarenal calculi as described. Stable cardiomegaly. Multiple areas of vascular aneurysm for example descending thoracic aorta aneurysm measures 6.1 cm at the diaphragmatic hiatus (previous 4.6 cm). 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 Aneurysm of the common hepatic artery measures 3.9 cm (previous 2.5 cm). Aneurysm of the left common iliac artery measures 2.6 cm  (previous 2.1 cm). Calcified dissection flap noted in the abdominal aorta consistent with chronic dissection. Workstation performed: [id number]       Total time for visit, including counseling/coordination of care: 60 minutes. Greater than 50% of this total time spent on direct patient counseling and coorination of care.     Epic Records Reviewed as well as Records in Care Everywhere    ** Please Note: Dragon 360 Dictation voice to text software was used in the creation of this document. **

## 2024-06-17 NOTE — ASSESSMENT & PLAN NOTE
History of ischemic cardiomyopathy with last echocardiogram in our system from 2014 with LVEF 20-25% per Dr. Kitchen note with cardiology in 2023  On PTA aspirin, Toprol, rosuvastatin, lisinopril, and eplerenone  Continue aspirin, Toprol and rosuvastatin  Hold lisinopril as well as eplerenone in setting of UTI as well as obstructing ureteral calculi

## 2024-06-17 NOTE — ASSESSMENT & PLAN NOTE
"Lab Results   Component Value Date    HGBA1C 5.8 (H) 01/27/2014       No results for input(s): \"POCGLU\" in the last 72 hours.    Blood Sugar Average: Last 72 hrs:    On PTA metformin   Will use sliding scale insulin while hospitalized  "

## 2024-06-17 NOTE — ASSESSMENT & PLAN NOTE
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 Ananth Arias with [facility] vascular surgery for these issues over at least the last 2 years from review of notes in care everywhere   Reviewed Dr. Arias's note from 3/28/2023 and he reports that he would recommend repair of the aneurysm when it is 6.5cm.  Dr. Arias additionally noted continued monitoring of his chronic aortic dissection as well as other aneurysms with yearly surveillance and monitoring for symptoms  Patient had his CT angio for monitoring on 5/8/2024 which I reviewed from care everywhere and which noted in the impression: \" Mild aneurysm of the ascending aorta.  Large aneurysm is fusiform of the abdominal aorta with chronic dissection extending from the upper abdominal aorta to the aortic bifurcation.  Extensive thrombus in the aortic aneurysm.  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  Large aneurysm of the hepatic artery with extensive thrombus.  Extensive thrombus with significant stenosis of the splenic artery.  Left common iliac artery aneurysm.  No evidence of a leak.  No significant change compared to last exam.  No evidence of pulmonary embolism\"; radiologist reading study compared 5/8/2024 study with study from 3/15/2023  Patient also reports that he saw Dr. Arias in the office on 5/14/24 regarding the study and that Dr. Arias recommended continued monitoring.  I did inform patient that I was unable to see the documentation from Dr. Arias it is note but patient reported \"Oh I was there.\"  Noted CT imaging today which notes right flank pain \"can be explained by 2 mm calculus in the midline of the bladder that likely recently passed as well as a distal right " "ureteral calculus measuring 2 mm resulting in hydronephrosis\" but also does note patient's multiple areas of vascular aneurysm as well as chronic dissection  On exam clinically, patient's symptoms are right flank pain that radiates to the right lower quadrant of the abdomen that comes and goes in waves and he reports that this feels exactly similar to the pain he has had with previous kidney stones; denies any chest pain.  As such, clinically would agree that patient's symptoms can be explained by the ureteral calculi.   "

## 2024-06-17 NOTE — ASSESSMENT & PLAN NOTE
History of cardiac arrest in 2017 secondary to coronary artery disease for which an LAD stent was placed as well as ICD   Denies any chest pain

## 2024-06-17 NOTE — ASSESSMENT & PLAN NOTE
Lab Results   Component Value Date    HGBA1C 7.3 (H) 06/17/2024       Recent Labs     06/17/24  0150 06/17/24  0939   POCGLU 238* 165*       Blood Sugar Average: Last 72 hrs:  (P) 201.5  On PTA metformin   Will use sliding scale insulin while hospitalized

## 2024-06-17 NOTE — ASSESSMENT & PLAN NOTE
Right flank pain that comes and goes in waves which began last night as well as dysuria and hematuria  Afebrile, no leukocytosis  UA with evidence of pyuria as well as hematuria  Creatinine within normal limits  CT abdomen pelvis with impression that notes right flank pain can be explained by 2 mm calculus in the midline of the bladder that likely recently passed as well as a distal right ureteral calculus measuring 2 mm resulting in hydronephrosis.   CT additionally does note multiple areas of vascular abnormalities (see aneursym section and chronic aortic dissection sections for details)  Urology consulted:  Patient has passed stone, cleared for discharge  Addition to oral Keflex to complete 7 days of treatment on discharge  Continue Flomax on discharge

## 2024-06-17 NOTE — ASSESSMENT & PLAN NOTE
History of cardiac arrest in 2017 secondary to coronary artery disease for which an LAD stent was placed as well as ICD   Denies any chest pain  Continue PTA aspirin, Toprol and statin  Holding PTA lisinopril as well as Norvasc in setting of UTI as well as obstructing ureteral calculi

## 2024-06-17 NOTE — DISCHARGE SUMMARY
"[facility]  Discharge- Kvng Bynum 1955, 68 y.o. male MRN: [id number]  Unit/Bed#: ED 06 Encounter: [id number]  Primary Care Provider: Lisbeth Pierre   Date and time admitted to hospital: 6/16/2024  7:26 PM    Ischemic cardiomyopathy  Assessment & Plan  History of ischemic cardiomyopathy with last echocardiogram in our system from 2014 with LVEF 20-25% per Dr. 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 Ananth Arias with [facility] vascular surgery for these issues over at least the last 2 years from review of notes in care everywhere   Prior provider reviewed Dr. Arias's note from 3/28/2023 and he reports that he would recommend repair of the aneurysm when it is 6.5cm.  Dr. Arias additionally noted continued monitoring of his chronic aortic dissection as well as other aneurysms with yearly surveillance and monitoring for symptoms  Patient had his CT angio for monitoring on 5/8/2024 which I reviewed from care everywhere and which noted in the impression: \" Mild aneurysm of the ascending aorta.  Large aneurysm is fusiform of the abdominal aorta with chronic dissection extending from the upper abdominal aorta to the aortic bifurcation.  Extensive thrombus in the aortic aneurysm.  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  Large aneurysm of the hepatic artery with extensive " "thrombus.  Extensive thrombus with significant stenosis of the splenic artery.  Left common iliac artery aneurysm.  No evidence of a leak.  No significant change compared to last exam.  No evidence of pulmonary embolism\"; radiologist reading study compared 5/8/2024 study with study from 3/15/2023  Patient also reports that he saw Dr. Arias in the office on 5/14/24 regarding the study and that Dr. Arias recommended continued monitoring.  I did inform patient that I was unable to see the documentation from Dr. Arias it is note but patient reported \"Oh I was there.\"  Noted CT imaging today which notes right flank pain \"can be explained by 2 mm calculus in the midline of the bladder that likely recently passed as well as a distal right ureteral calculus measuring 2 mm resulting in hydronephrosis\" but also does note patient's multiple areas of vascular aneurysm as well as chronic dissection  On exam clinically, patient's symptoms are right flank pain that radiates to the right lower quadrant of the abdomen that comes and goes in waves and he reports that this feels exactly similar to the pain he has had with previous kidney stones; denies any chest pain.  As such, clinically would agree that patient's symptoms can be explained by the ureteral calculi.     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 Ananth Arias with [facility] vascular surgery for these issues over at least the last 2 years from review of notes in care everywhere   Prior provider reviewed Dr. Arias's note from 3/28/2023 and he reports that he would recommend repair of the aneurysm when it is 6.5cm.  Dr. Arias " "additionally noted continued monitoring of his chronic aortic dissection as well as other aneurysms with yearly surveillance and monitoring for symptoms  Patient had his CT angio for monitoring on 5/8/2024 which I reviewed from care everywhere and which noted in the impression: \" Mild aneurysm of the ascending aorta.  Large aneurysm is fusiform of the abdominal aorta with chronic dissection extending from the upper abdominal aorta to the aortic bifurcation.  Extensive thrombus in the aortic aneurysm.  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  Large aneurysm of the hepatic artery with extensive thrombus.  Extensive thrombus with significant stenosis of the splenic artery.  Left common iliac artery aneurysm.  No evidence of a leak.  No significant change compared to last exam.  No evidence of pulmonary embolism\"; radiologist reading study compared 5/8/2024 study with study from 3/15/2023  Patient also reports that he saw Dr. Arias in the office on 5/14/24 regarding the study and that Dr. Arias recommended continued monitoring.  I did inform patient that I was unable to see the documentation from Dr. Arias it is note but patient reported \"Oh I was there.\"  Noted CT imaging today which notes right flank pain \"can be explained by 2 mm calculus in the midline of the bladder that likely recently passed as well as a distal right ureteral calculus measuring 2 mm resulting in hydronephrosis\" but also does note patient's multiple areas of vascular aneurysm as well as chronic dissection  On exam clinically, patient's symptoms are right flank pain that radiates to the right lower quadrant of the abdomen that comes and goes in waves and he reports that this feels exactly similar to the pain he has had with previous kidney stones; denies any chest pain.  As such, clinically would agree that patient's symptoms can be explained by the ureteral calculi.     Acute cystitis with hematuria  Assessment & Plan  UA with " evidence of infection as well as hematuria  In setting of obstructing ureteral calculi  Ceftriaxone while admitted - Urology recommending discharge on Keflex to complete course    Diabetes mellitus (HCC)  Assessment & Plan  Lab Results   Component Value Date    HGBA1C 7.3 (H) 06/17/2024       Recent Labs     06/17/24  0150 06/17/24  0939   POCGLU 238* 165*       Blood Sugar Average: Last 72 hrs:  (P) 201.5  On PTA metformin   Will use sliding scale insulin while hospitalized    CAD (coronary artery disease)  Assessment & Plan  History of cardiac arrest in 2017 secondary to coronary artery disease for which an LAD stent was placed as well as ICD   Denies any chest pain  Continue PTA aspirin, Toprol and statin  Held PTA lisinopril as well as Norvasc in setting of UTI as well as obstructing ureteral calculi  Resume on discharge as patient has passed stone    ICD (implantable 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  Assessment & Plan  History of cardiac arrest in 2017 secondary to coronary artery disease for which an LAD stent was placed as well as ICD   Denies any chest pain    * Hydronephrosis with ureteral calculus  Assessment & Plan  Right flank pain that comes and goes in waves which began last night as well as dysuria and hematuria  Afebrile, no leukocytosis  UA with evidence of pyuria as well as hematuria  Creatinine within normal limits  CT abdomen pelvis with impression that notes right flank pain can be explained by 2 mm calculus in the midline of the bladder that likely recently passed as well as a distal right ureteral calculus measuring 2 mm resulting in hydronephrosis.   CT additionally does note multiple areas of vascular abnormalities (see aneursym section and chronic aortic dissection sections for details)  Urology consulted:  Patient has passed stone, cleared for discharge  Addition to oral Keflex to complete 7 days of treatment on discharge  Continue Flomax on discharge      Medical Problems        Resolved Problems  Date Reviewed: 6/17/2024   None       Discharging Physician / Practitioner: Bree Flores PA-C  PCP: Lisbeth Pierre  Admission Date:   Admission Orders (From admission, onward)       Ordered        06/16/24 2359  INPATIENT ADMISSION  Once                          Discharge Date: 06/17/24    Consultations During Hospital Stay:  Urology    Procedures Performed:   None    Significant Findings / Test Results:   CT abdomen pelvis 6/16:  Right flank pain can be explained by 2 mm calculus in the midline of the bladder that likely recently passed as well as a distal right ureteral calculus measuring 2 mm resulting in hydronephrosis.  Bilateral nonobstructing intrarenal calculi as described.  Stable cardiomegaly.  Multiple areas of vascular aneurysm for example descending thoracic aorta aneurysm measures 6.1 cm at the diaphragmatic hiatus (previous 4.6 cm).  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  Aneurysm of the common hepatic artery measures 3.9 cm (previous 2.5 cm).  Aneurysm of the left common iliac artery measures 2.6 cm (previous 2.1 cm).  Calcified dissection flap noted in the abdominal aorta consistent with chronic dissection.    Incidental Findings:   None     Test Results Pending at Discharge (will require follow up):   Final Urine Culture results     Outpatient Tests Requested:  None    Complications:  None    Reason for Admission: Nephrolithiasis    Hospital Course:   Kvng Bynum is a 68 y.o. male patient with past medical history of CAD, ischemic cardiomyopathy, ICD, chronic type B aortic dissection, thoracoabdominal aortic aneurysm, multiple other areas of vascular aneurysm, nephrolithiasis who originally presented to the hospital on 6/16/2024 due to right-sided flank pain that was intermittent in nature with history of kidney stones.   Imaging consistent with 2 mm stone.  Urology was consulted for consideration of possible stenting.  Fortunately, since time of  admission patient was able to pass the stone and has been pain-free for several hours.  Urology cleared patient for discharge and are recommending completing 6 additional days of Keflex as well as Flomax upon discharge.  Hemodynamically stable and appropriate for outpatient follow-up.  He may follow-up on urine culture data outpatient.    Hospital Course: No notes on file    The patient, initially admitted to the hospital as inpatient, was discharged earlier than expected given the following: Cleared by Urology.    Please see above list of diagnoses and related plan for additional information.     Condition at Discharge: stable    Discharge Day Visit / Exam:   * Please refer to separate progress note for these details *    Discussion with Family: Patient declined call to .     Discharge instructions/Information to patient and family:   See after visit summary for information provided to patient and family.      Provisions for Follow-Up Care:  See after visit summary for information related to follow-up care and any pertinent home health orders.      Mobility at time of Discharge:      HLM Goal achieved. Continue to encourage appropriate mobility.     Disposition:   Home    Planned Readmission: None     Discharge Statement:  I spent 65 minutes discharging the patient. This time was spent on the day of discharge. I had direct contact with the patient on the day of discharge. Greater than 50% of the total time was spent examining patient, answering all patient questions, arranging and discussing plan of care with patient as well as directly providing post-discharge instructions.  Additional time then spent on discharge activities.    Discharge Medications:  See after visit summary for reconciled discharge medications provided to patient and/or family.      **Please Note: This note may have been constructed using a voice recognition system**

## 2024-06-17 NOTE — ASSESSMENT & PLAN NOTE
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 Ananth Arias with [facility] vascular surgery for these issues over at least the last 2 years from review of notes in care everywhere   Prior provider reviewed Dr. Arias's note from 3/28/2023 and he reports that he would recommend repair of the aneurysm when it is 6.5cm.  Dr. Arias additionally noted continued monitoring of his chronic aortic dissection as well as other aneurysms with yearly surveillance and monitoring for symptoms  Patient had his CT angio for monitoring on 5/8/2024 which I reviewed from care everywhere and which noted in the impression: \" Mild aneurysm of the ascending aorta.  Large aneurysm is fusiform of the abdominal aorta with chronic dissection extending from the upper abdominal aorta to the aortic bifurcation.  Extensive thrombus in the aortic aneurysm.  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  Large aneurysm of the hepatic artery with extensive thrombus.  Extensive thrombus with significant stenosis of the splenic artery.  Left common iliac artery aneurysm.  No evidence of a leak.  No significant change compared to last exam.  No evidence of pulmonary embolism\"; radiologist reading study compared 5/8/2024 study with study from 3/15/2023  Patient also reports that he saw Dr. Arias in the office on 5/14/24 regarding the study and that Dr. Arias recommended continued monitoring.  I did inform patient that I was unable to see the documentation from Dr. Arias it is note but patient reported \"Oh I was there.\"  Noted CT imaging today which notes right flank pain \"can be explained by 2 mm calculus in the midline of the bladder that likely recently passed as well as a " "distal right ureteral calculus measuring 2 mm resulting in hydronephrosis\" but also does note patient's multiple areas of vascular aneurysm as well as chronic dissection  On exam clinically, patient's symptoms are right flank pain that radiates to the right lower quadrant of the abdomen that comes and goes in waves and he reports that this feels exactly similar to the pain he has had with previous kidney stones; denies any chest pain.  As such, clinically would agree that patient's symptoms can be explained by the ureteral calculi.   "

## 2024-06-17 NOTE — ASSESSMENT & PLAN NOTE
Right flank pain that comes and goes in waves which began last night as well as dysuria and hematuria  Afebrile, no leukocytosis  UA with evidence of pyuria as well as hematuria  Creatinine within normal limits  CT abdomen pelvis with impression that notes right flank pain can be explained by 2 mm calculus in the midline of the bladder that likely recently passed as well as a distal right ureteral calculus measuring 2 mm resulting in hydronephrosis.   CT additionally does note multiple areas of vascular abnormalities (see aneursym section and chronic aortic dissection sections for details)  Admit to medicine.  Start IV ceftriaxone.  Keep n.p.o. except for medications.  Consult urology for potential need for ureteral stent placement pending their evaluation in AM.  IV fluids.  Pain control.

## 2024-06-19 LAB — BACTERIA UR CULT: NORMAL

## 2024-06-26 ENCOUNTER — OFFICE VISIT (OUTPATIENT)
Dept: VASCULAR SURGERY | Facility: CLINIC | Age: 69
End: 2024-06-26
Payer: MEDICARE

## 2024-06-26 VITALS
HEART RATE: 73 BPM | HEIGHT: 73 IN | BODY MASS INDEX: 38.17 KG/M2 | SYSTOLIC BLOOD PRESSURE: 152 MMHG | DIASTOLIC BLOOD PRESSURE: 100 MMHG | OXYGEN SATURATION: 92 % | WEIGHT: 288 LBS

## 2024-06-26 DIAGNOSIS — I71.019 CHRONIC THORACIC AORTIC DISSECTION (HCC): ICD-10-CM

## 2024-06-26 DIAGNOSIS — I25.10 CORONARY ARTERY DISEASE DUE TO LIPID RICH PLAQUE: ICD-10-CM

## 2024-06-26 DIAGNOSIS — R06.09 DYSPNEA ON EXERTION: ICD-10-CM

## 2024-06-26 DIAGNOSIS — I50.9 CHRONIC CONGESTIVE HEART FAILURE, UNSPECIFIED HEART FAILURE TYPE (HCC): ICD-10-CM

## 2024-06-26 DIAGNOSIS — I71.20 THORACIC AORTIC ANEURYSM (HCC): ICD-10-CM

## 2024-06-26 DIAGNOSIS — Z95.810 ICD (IMPLANTABLE CARDIOVERTER-DEFIBRILLATOR) IN PLACE: ICD-10-CM

## 2024-06-26 DIAGNOSIS — Z72.0 TOBACCO ABUSE: ICD-10-CM

## 2024-06-26 DIAGNOSIS — I72.8 HEPATIC ARTERY ANEURYSM (HCC): Primary | ICD-10-CM

## 2024-06-26 DIAGNOSIS — I25.83 CORONARY ARTERY DISEASE DUE TO LIPID RICH PLAQUE: ICD-10-CM

## 2024-06-26 DIAGNOSIS — E78.5 HYPERLIPIDEMIA, UNSPECIFIED HYPERLIPIDEMIA TYPE: ICD-10-CM

## 2024-06-26 PROCEDURE — 99205 OFFICE O/P NEW HI 60 MIN: CPT | Performed by: SURGERY

## 2024-06-26 NOTE — PROGRESS NOTES
Assessment/Plan:     Pt is a 67 yo M w/ CAD, arrest in '17, s/p ICD, CHF, HTN, ICM, DM, kidney stones w/ recent acute cystitis, HLD, type B aortic dissection with aneurysmal degeneration, asc AA, TAA, hepatic artery aneurysm    Hepatic artery aneurysm (HCC)  Thoracic aortic aneurysm (HCC)  Chronic thoracic aortic dissection (HCC)  -based on chart review, appears that he had acute type B dissection in '07 and degeneration into TAA, hepatic art aneurys, and L ICA aneurysm since then  -he has been followed longterm by Dr. Arias first at [facility] and then at [facility] and would like to continue following with him.  Patient doesn't know why he is here today and is not seeking a second opinion  -reviewed recent CT abd which is noncontrast as well as the report from [facility] CTA w/ contrast from May; I do not have the contrasted imaging for review today  -he has a TAA which is 6.1cm in size as well as L MERRY aneurysm which is 2.5cm and most concerning is a common hepatic artery aneurysm which is 3.9 cm.  The celiac trunk is also enlarged  -this is a very difficult problem as complete repair would require reconstruction of his thoracic and abdominal aorta with at least a bypass to the distal hepatic (I cannot determine endpoint on noncontrasted imaging); his infrarenal aorta is dissected and distal endpoint would be difficult as well; he has significant risk factors for open thoracoabdominal repair with CAD, CHF, and hx of arrest as well as obesity BMI: 38, prior abdominal surgery, and active smoking  -did offer patient formal review of his situation after obtaining proper imaging and return visit but patient declined as he is already established with vascular surgery  -f/u PRN    Chronic congestive heart failure, unspecified heart failure type (HCC)  Coronary artery disease due to lipid rich plaque  Dyspnea on exertion  ICD (implantable 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  -hx of cardiac arrest in '17  -s/p ICD  -although I cannot see  his cardiac testing, mention in chart of EF: 20-25%  -he is at significantly elevated cardiac risk for any open aneurysm repair    Hyperlipidemia, unspecified hyperlipidemia type  Medications  -cont ASA/statin    Tobacco abuse  -cotninues to smoke almost 1PPD; discussed relationship between smoking and aneurysmal disease as well as increased risks with surgery and active smoking; he tried quitting a couple of months ago with the patch and lozenges and [facility] but is not actively trying to quit; suggested other adjuncts such as wellbutrin or chantix as well; patient would [facility] to [facility] through the [facility]. Time spent on smoking cessation: 5 min    Subjective:     Patient ID: Kvng Bynum is a 68 y.o. male.    Patient presents for evaluation of AAA. His most recent testing was a CT abd/pel done 6/16/24 and CTA chest/abd/pel done at [facility] on 5/8/24. He denies abdominal  pain, pain after eating, or back pain. He is taking ASA 81 mg and Rosuvastatin. He is a current smoker, less than a pack a day.     HPI:    Patient referred for aneurysmal disease.    Patient already is established with Dr. Arias from [facility] who followed him at [facility] as well.  Patient is not looking to switch surgeons and is unsure why he is here.  [facility] said that [facility] told [facility] and so he came.    Patient is aware that he has these aneurysms. He has contrasted imaging from [facility] in May but this is not available to me today.    Patient notes acute thoracic aortic dissection (from PACS, looks like this was in '07).  He notes acute ER visit for back/chest pain.    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    Denies chest pain or back pain currently.  Recent admission for kidney stones with lower back pain which is resolved.    He gets SOB with activity (1 flight of stairs or a hill).  Denies  "claudication. He has hx of cardiac arrest in ?'17?    Currently smokes almost 1PPD.  Has family hx of aneurysm in his sister.    Takes ASA, statin.          Review of Systems   Constitutional: Negative.    HENT: Negative.     Eyes: Negative.    Respiratory:  Positive for shortness of breath (On exertion).    Cardiovascular: Negative.    Gastrointestinal: Negative.    Endocrine: Negative.    Genitourinary: Negative.    Musculoskeletal: Negative.    Skin: Negative.    Allergic/Immunologic: Negative.    Neurological: Negative.    Hematological: Negative.    Psychiatric/Behavioral: Negative.           Objective:     Physical Exam  Cardiovascular:      Rate and Rhythm: Normal rate and regular rhythm.      Pulses:           Radial pulses are 2+ on the right side and 2+ on the left side.        Popliteal pulses are 0 on the right side and 0 on the left side.        Dorsalis pedis pulses are 0 on the right side and 0 on the left side.        Posterior tibial pulses are 0 on the right side and 0 on the left side.      Heart sounds: No murmur heard.  Pulmonary:      Effort: No respiratory distress.      Breath sounds: No wheezing or rales.   Abdominal:      Tenderness: There is no guarding or rebound.      Comments: Obese, protuberant; well healed small incision at the umbilicus (per patient umbo hernia repair); there are several other small areas in the RUQ, cannot tell if these are healed scars or stretch marks  Nontender; cannot palpate aneurysms 2/2 obesity   Musculoskeletal:      Right lower leg: No edema.      Left lower leg: No edema.           I have reviewed and made appropriate changes to the review of systems input by the medical assistant.    Vitals:    06/26/24 0828   BP: 152/100   BP Location: Left arm   Patient Position: Sitting   Cuff Size: Standard   Pulse: 73   SpO2: 92%   Weight: 131 kg (288 lb)   Height: 6' 1\" (1.854 m)       Patient Active Problem List   Diagnosis   • Adenomatous duodenal polyp   • " Dyspnea on exertion   • Fatigue   • ICD (implantable 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   • Myocardial infarction (HCC)   • Hyperlipidemia   • CAD (coronary artery disease)   • CHF (congestive heart failure) (HCC)   • Diabetes mellitus (HCC)   • Hypertension   • Hydronephrosis with ureteral calculus   • Acute cystitis with hematuria   • Chronic thoracic aortic dissection (HCC)   • Thoracic aortic aneurysm (HCC)   • Ischemic cardiomyopathy   • Hepatic artery aneurysm (HCC)   • Tobacco abuse       Past Surgical History:   Procedure Laterality Date   • CARDIAC ELECTROPHYSIOLOGY PROCEDURE N/A 1/19/2024    Procedure: Cardiac icd generator change and DEVICE UPGRADE TO DC ICD;  Surgeon: Kobi Kitchen MD;  [facility];  Service: Cardiology   • COLONOSCOPY     • ESOPHAGOGASTRODUODENOSCOPY     • INSERT / REPLACE / REMOVE PACEMAKER     • IR UPPER EXTREMITY VENOGRAM- DIAGNOSTIC  12/22/2023   • ME EDG US EXAM SURGICAL ALTER STOM DUODENUM/JEJUNUM N/A 12/26/2018    Procedure: RADIAL ENDOSCOPIC U/S WITH POLYPECTOMY;  Surgeon: Bob Andrea MD;  Location: [facility];  Service: Gastroenterology   • TONSILLECTOMY         No family history on file.    Social History     Socioeconomic History   • Marital status: Single     Spouse name: Not on file   • Number of children: Not on file   • Years of education: Not on file   • Highest education level: Not on file   Occupational History   • Not on file   Tobacco Use   • Smoking status: Some Days   • Smokeless tobacco: Never   • Tobacco comments:     1 OR 2 CIGS DAILY   Substance and Sexual Activity   • Alcohol use: Yes     Comment: 3X/WEEK 6 PACK BEER   • Drug use: No   • Sexual activity: Not on file   Other Topics Concern   • Not on file   Social History Narrative   • Not on file     Social Determinants of Health     Financial Resource Strain: Not on file   Food Insecurity: Not on file   Transportation Needs: Not on file   Physical Activity: Not on file   Stress: Not on file    Social Connections: Not on file   Intimate Partner Violence: Not on file   Housing Stability: Not on file       No Known Allergies      Current Outpatient Medications:   •  amLODIPine (NORVASC) 5 mg tablet, Take 1 tablet (5 mg total) by mouth daily, Disp: , Rfl:   •  aspirin 81 MG tablet, Take 1 tablet by mouth daily, Disp: , Rfl:   •  Cholecalciferol (VITAMIN D3) 1000 units CAPS, Take by mouth, Disp: , Rfl:   •  cyanocobalamin 100 MCG tablet, Take 100 mcg by mouth daily, Disp: , Rfl:   •  eplerenone (INSPRA) 25 mg tablet, Take 25 mg by mouth daily, Disp: , Rfl:   •  lisinopril (ZESTRIL) 40 mg tablet, Take 1 tablet by mouth daily, Disp: , Rfl:   •  metFORMIN (GLUCOPHAGE) 500 mg tablet, Take 1 tablet by mouth 2 (two) times a day, Disp: , Rfl:   •  metoprolol succinate (TOPROL-XL) 100 mg 24 hr tablet, Take 0.5 tablets by mouth, Disp: , Rfl:   •  oxyCODONE (ROXICODONE) 5 immediate release tablet, Take 1 tablet (5 mg total) by mouth every 6 (six) hours as needed for moderate pain for up to 10 days Max Daily Amount: 20 mg, Disp: 20 tablet, Rfl: 0  •  rosuvastatin (CRESTOR) 40 MG tablet, Take 40 mg by mouth daily, Disp: , Rfl:   •  tamsulosin (FLOMAX) 0.4 mg, Take 1 capsule (0.4 mg total) by mouth daily with dinner, Disp: 30 capsule, Rfl: 0  •  nicotine polacrilex (COMMIT) 4 MG lozenge, Apply 1 lozenge to the mouth or throat as needed (Patient not taking: Reported on 6/26/2024), Disp: , Rfl:

## 2024-07-17 PROBLEM — N30.01 ACUTE CYSTITIS WITH HEMATURIA: Status: RESOLVED | Noted: 2024-06-17 | Resolved: 2024-07-17

## 2024-08-06 ENCOUNTER — REMOTE DEVICE CLINIC VISIT (OUTPATIENT)
Dept: CARDIOLOGY CLINIC | Facility: CLINIC | Age: 69
End: 2024-08-06
Payer: COMMERCIAL

## 2024-08-06 DIAGNOSIS — Z95.810 AICD (AUTOMATIC CARDIOVERTER/DEFIBRILLATOR) PRESENT: Primary | ICD-10-CM

## 2024-08-06 PROCEDURE — 93296 REM INTERROG EVL PM/IDS: CPT | Performed by: STUDENT IN AN ORGANIZED HEALTH CARE EDUCATION/TRAINING PROGRAM

## 2024-08-06 PROCEDURE — 93295 DEV INTERROG REMOTE 1/2/MLT: CPT | Performed by: STUDENT IN AN ORGANIZED HEALTH CARE EDUCATION/TRAINING PROGRAM

## 2024-08-07 NOTE — PROGRESS NOTES
"Results for orders placed or performed in visit on 08/06/24   Cardiac EP device report    Narrative    MDT DUAL ICD/ ACTIVE SYSTEM IS MRI CONDITIONAL  CARELINK TRANSMISSION: BATTERY STATUS \"11 YRS.\" AP 73%  0%. ALL AVAILABLE LEAD PARAMETERS WITHIN NORMAL LIMITS. NO SIGNIFICANT HIGH RATE EPISODES.NORMAL DEVICE FUNCTION. NC         "

## 2024-09-19 ENCOUNTER — APPOINTMENT (EMERGENCY)
Dept: RADIOLOGY | Facility: HOSPITAL | Age: 69
End: 2024-09-19
Payer: COMMERCIAL

## 2024-09-19 ENCOUNTER — HOSPITAL ENCOUNTER (EMERGENCY)
Facility: HOSPITAL | Age: 69
Discharge: HOME | End: 2024-09-19
Attending: EMERGENCY MEDICINE
Payer: COMMERCIAL

## 2024-09-19 VITALS
DIASTOLIC BLOOD PRESSURE: 116 MMHG | RESPIRATION RATE: 17 BRPM | SYSTOLIC BLOOD PRESSURE: 184 MMHG | TEMPERATURE: 98.6 F | OXYGEN SATURATION: 96 % | HEART RATE: 78 BPM

## 2024-09-19 DIAGNOSIS — M79.675 TOE PAIN, LEFT: Primary | ICD-10-CM

## 2024-09-19 PROCEDURE — 73660 X-RAY EXAM OF TOE(S): CPT

## 2024-09-19 PROCEDURE — 99284 EMERGENCY DEPT VISIT MOD MDM: CPT | Performed by: EMERGENCY MEDICINE

## 2024-09-19 PROCEDURE — 99283 EMERGENCY DEPT VISIT LOW MDM: CPT

## 2024-09-19 RX ORDER — IBUPROFEN 400 MG/1
400 TABLET, FILM COATED ORAL ONCE
Status: COMPLETED | OUTPATIENT
Start: 2024-09-19 | End: 2024-09-19

## 2024-09-19 RX ORDER — ACETAMINOPHEN 325 MG/1
975 TABLET ORAL ONCE
Status: COMPLETED | OUTPATIENT
Start: 2024-09-19 | End: 2024-09-19

## 2024-09-19 RX ADMIN — ACETAMINOPHEN 975 MG: 325 TABLET ORAL at 23:15

## 2024-09-19 RX ADMIN — IBUPROFEN 400 MG: 400 TABLET, FILM COATED ORAL at 23:15

## 2024-09-20 NOTE — ED PROVIDER NOTES
1. Toe pain, left      ED Disposition       ED Disposition   Discharge    Condition   Stable    Date/Time   u Sep 19, 2024 11:40 PM    Comment   Kvng Bynum discharge to home/self care.                   Assessment & Plan       Medical Decision Making  Patient presents for evaluation of toe pain.      Differential includes gout, fracture, dislocation, osteoarthritis.     Plan: X-rays, Tylenol and Motrin     X-rays negative for acute abnormality.  He was told to continue with NSAIDs and follow-up with his PCP.  He was given return precautions and discharged in stable condition.        Amount and/or Complexity of Data Reviewed  Radiology: ordered and independent interpretation performed.    Risk  OTC drugs.  Prescription drug management.                     Medications   acetaminophen (TYLENOL) tablet 975 mg (975 mg Oral Given 9/19/24 2315)   ibuprofen (MOTRIN) tablet 400 mg (400 mg Oral Given 9/19/24 2315)       History of Present Illness       HPI    Patient is a 69-year-old male with history of DM and history of gout who presents with left great toe pain. his toe began to hurt earlier today while at work.  It has progressively worsened throughout the day.  He has not tried any medication for the pain.  He said years ago he dropped a spackle bucket on it but never had it evaluated and thinks this may be contributing to pain today. He believes the weather is making it worse.  He is able to ambulate but it is painful.  He has not had a gout flareup in several years and says it is typically present in all of his toes.  He denies recent trauma.  He denies fever or chills.      Review of Systems        Objective     ED Triage Vitals [09/19/24 2239]   Temperature Pulse Blood Pressure Respirations SpO2 Patient Position - Orthostatic VS   98.6 °F (37 °C) 78 (!) 184/116 17 96 % --      Temp Source Heart Rate Source BP Location FiO2 (%) Pain Score    Temporal Monitor -- -- 4        Physical Exam  Vitals and nursing note  reviewed.   HENT:      Head: Normocephalic and atraumatic.      Mouth/Throat:      Mouth: Mucous membranes are moist.   Eyes:      Extraocular Movements: Extraocular movements intact.   Cardiovascular:      Rate and Rhythm: Normal rate and regular rhythm.   Pulmonary:      Effort: Pulmonary effort is normal.   Musculoskeletal:      Cervical back: Normal range of motion.      Comments: Base of left great toe tender to palpation.  No erythema or warmth.    Skin:     General: Skin is warm and dry.      Capillary Refill: Capillary refill takes less than 2 seconds.      Findings: No bruising or erythema.   Neurological:      Mental Status: He is alert.      Sensory: No sensory deficit.      Motor: No weakness.         Labs Reviewed - No data to display  XR toe great min 2 views LEFT   ED Interpretation by Marine Simmons MD (09/19 3652)   No acute osseous abnormality.       Final Interpretation by Lico Malave MD (09/20 7350)      No acute osseous abnormality.         Computerized Assisted Algorithm (CAA) may have been used to analyze all applicable images.            Workstation performed: [id number]             Procedures    ED Medication and Procedure Management   Prior to Admission Medications   Prescriptions Last Dose Informant Patient Reported? Taking?   Cholecalciferol (VITAMIN D3) 1000 units CAPS  Self Yes No   Sig: Take by mouth   amLODIPine (NORVASC) 5 mg tablet   No No   Sig: Take 1 tablet (5 mg total) by mouth daily   aspirin 81 MG tablet  Self Yes No   Sig: Take 1 tablet by mouth daily   cyanocobalamin 100 MCG tablet  Self Yes No   Sig: Take 100 mcg by mouth daily   eplerenone (INSPRA) 25 mg tablet  Self Yes No   Sig: Take 25 mg by mouth daily   lisinopril (ZESTRIL) 40 mg tablet  Self Yes No   Sig: Take 1 tablet by mouth daily   metFORMIN (GLUCOPHAGE) 500 mg tablet  Self Yes No   Sig: Take 1 tablet by mouth 2 (two) times a day   metoprolol succinate (TOPROL-XL) 100 mg 24 hr tablet  Self Yes No   Sig: Take 0.5  tablets by mouth   nicotine polacrilex (COMMIT) 4 MG lozenge   Yes No   Sig: Apply 1 lozenge to the mouth or throat as needed   Patient not taking: Reported on 6/26/2024   rosuvastatin (CRESTOR) 40 MG tablet  Self Yes No   Sig: Take 40 mg by mouth daily   tamsulosin (FLOMAX) 0.4 mg   No No   Sig: Take 1 capsule (0.4 mg total) by mouth daily with dinner      Facility-Administered Medications: None     Discharge Medication List as of 9/19/2024 11:41 PM        CONTINUE these medications which have NOT CHANGED    Details   amLODIPine (NORVASC) 5 mg tablet Take 1 tablet (5 mg total) by mouth daily, Starting Mon 6/17/2024, No Print      aspirin 81 MG tablet Take 1 tablet by mouth daily, Historical Med      Cholecalciferol (VITAMIN D3) 1000 units CAPS Take by mouth, Starting Mon 1/27/2014, Historical Med      cyanocobalamin 100 MCG tablet Take 100 mcg by mouth daily, Historical Med      eplerenone (INSPRA) 25 mg tablet Take 25 mg by mouth daily, Historical Med      lisinopril (ZESTRIL) 40 mg tablet Take 1 tablet by mouth daily, Starting Wed 8/28/2013, Historical Med      metFORMIN (GLUCOPHAGE) 500 mg tablet Take 1 tablet by mouth 2 (two) times a day, Starting Tue 2/7/2012, Historical Med      metoprolol succinate (TOPROL-XL) 100 mg 24 hr tablet Take 0.5 tablets by mouth, Starting Thu 12/10/2015, Historical Med      nicotine polacrilex (COMMIT) 4 MG lozenge Apply 1 lozenge to the mouth or throat as needed, Historical Med      rosuvastatin (CRESTOR) 40 MG tablet Take 40 mg by mouth daily, Historical Med      tamsulosin (FLOMAX) 0.4 mg Take 1 capsule (0.4 mg total) by mouth daily with dinner, Starting Mon 6/17/2024, Normal           No discharge procedures on file.     Marine Simmons MD  09/20/24 112

## 2024-09-20 NOTE — DISCHARGE INSTRUCTIONS
You were seen in the Emergency Department today for toe pain.    You may take NSAIDs (Motrin, Aleve) and Tylenol for pain.     Please follow up with your primary care doctor in 1 week.  Please return to the Emergency Department if you experience worsening of your current symptoms, fever, severe pain, or any other concerning symptoms.

## 2024-09-20 NOTE — ED ATTENDING ATTESTATION
"9/19/2024  I, Isaac Sommer MD, saw and evaluated the patient. I have discussed the patient with the resident/non-physician practitioner and agree with the resident's/non-physician practitioner's findings, Plan of Care, and MDM as documented in the resident's/non-physician practitioner's note, except where noted. All available labs and Radiology studies were reviewed.  I was present for key portions of any procedure(s) performed by the resident/non-physician practitioner and I was immediately available to provide assistance.       At this point I agree with the current assessment done in the Emergency Department.  I have conducted an independent evaluation of this patient a history and physical is as follows:      Final Diagnosis:  1. Toe pain, left      Chief Complaint   Patient presents with    Foot Pain     L foot, dorsal, distal, near base of 1st digit. Started this am. \"It's a little swollen ... And it feels a little numb\". Hx L foot injury for which he wasn't seen, \"I dropped a spackle bucket on it ... It seems like the weather changing makes it worse\".            A:  -69-year-old male who presents with left great toe pain.      P:  -Arthritis versus gout.  Check x-ray left great toe.  NSAIDs.  Follow-up with family doctor.      H:    69-year-old male who presents with left great toe pain.  Started earlier this afternoon.  No inciting event or injury.  Does have a history of gout.      PMH:  Past Medical History:   Diagnosis Date    Aortic disease (HCC)     CAD (coronary artery disease) 1/18/2024    CHF (congestive heart failure) (HCC) 1/18/2024    Diabetes mellitus (HCC) 1/18/2024    Hyperlipidemia 1/18/2024    Hypertension 1/18/2024    ICD (implantable 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 1/18/2024    Mitral regurgitation     Myocardial infarction (HCC) 1/18/2024    Valvular heart disease        PSH:  Past Surgical History:   Procedure Laterality Date    CARDIAC ELECTROPHYSIOLOGY PROCEDURE N/A 1/19/2024 "    Procedure: Cardiac icd generator change and DEVICE UPGRADE TO DC ICD;  Surgeon: Kobi Kitchen MD;  [facility];  Service: Cardiology    COLONOSCOPY      ESOPHAGOGASTRODUODENOSCOPY      INSERT / REPLACE / REMOVE PACEMAKER      IR UPPER EXTREMITY VENOGRAM- DIAGNOSTIC  12/22/2023    IN EDG US EXAM SURGICAL ALTER STOM DUODENUM/JEJUNUM N/A 12/26/2018    Procedure: RADIAL ENDOSCOPIC U/S WITH POLYPECTOMY;  Surgeon: Bob Andrea MD;  Location: [facility];  Service: Gastroenterology    TONSILLECTOMY           PE:   Vitals:    09/19/24 2239   BP: (!) 184/116   Pulse: 78   Resp: 17   Temp: 98.6 °F (37 °C)   TempSrc: Temporal   SpO2: 96%         Constitutional: Vital signs are normal. He appears well-developed. He is cooperative. No distress.   Pulmonary/Chest: Effort normal.   Abdominal: Normal appearance.   Neurological: He is alert.  Skin: Skin is warm, dry and intact.   MSK: Mild erythema noted to the MTP of the left great toe.  Overlying tenderness.  No warmth.  Otherwise, left foot is normal.  Psychiatric: He has a normal mood and affect. His speech is normal and behavior is normal. Thought content normal.          - 13 point ROS was performed and all are normal unless stated in the history above.   - Nursing note reviewed. Vitals reviewed.   - Orders placed by myself and/or advanced practitioner / resident.    - Previous chart was reviewed  - No language barrier.   - History obtained from patient.   - There are no limitations to the history obtained. Reasons ROS could not be obtained:  N/A         Medications   acetaminophen (TYLENOL) tablet 975 mg (975 mg Oral Given 9/19/24 2315)   ibuprofen (MOTRIN) tablet 400 mg (400 mg Oral Given 9/19/24 2315)     XR toe great min 2 views LEFT   ED Interpretation   No acute osseous abnormality.         Orders Placed This Encounter   Procedures    XR toe great min 2 views LEFT     Labs Reviewed - No data to display  Time reflects when diagnosis was documented in both  "MDM as applicable and the Disposition within this note       Time User Action Codes Description Comment    9/19/2024 11:40 PM Marine Simmons Add [M79.675] Toe pain, left           ED Disposition       ED Disposition   Discharge    Condition   Stable    Date/Time   Thu Sep 19, 2024 11:40 PM    Comment   Kvng Bynum discharge to home/self care.                   Follow-up Information       Follow up With Specialties Details Why Contact Info    [facility]  [address]  [place] [address]  [phone]            Patient's Medications   Discharge Prescriptions    No medications on file     No discharge procedures on file.  Prior to Admission Medications   Prescriptions Last Dose Informant Patient Reported? Taking?   Cholecalciferol (VITAMIN D3) 1000 units CAPS  Self Yes No   Sig: Take by mouth   amLODIPine (NORVASC) 5 mg tablet   No No   Sig: Take 1 tablet (5 mg total) by mouth daily   aspirin 81 MG tablet  Self Yes No   Sig: Take 1 tablet by mouth daily   cyanocobalamin 100 MCG tablet  Self Yes No   Sig: Take 100 mcg by mouth daily   eplerenone (INSPRA) 25 mg tablet  Self Yes No   Sig: Take 25 mg by mouth daily   lisinopril (ZESTRIL) 40 mg tablet  Self Yes No   Sig: Take 1 tablet by mouth daily   metFORMIN (GLUCOPHAGE) 500 mg tablet  Self Yes No   Sig: Take 1 tablet by mouth 2 (two) times a day   metoprolol succinate (TOPROL-XL) 100 mg 24 hr tablet  Self Yes No   Sig: Take 0.5 tablets by mouth   nicotine polacrilex (COMMIT) 4 MG lozenge   Yes No   Sig: Apply 1 lozenge to the mouth or throat as needed   Patient not taking: Reported on 6/26/2024   rosuvastatin (CRESTOR) 40 MG tablet  Self Yes No   Sig: Take 40 mg by mouth daily   tamsulosin (FLOMAX) 0.4 mg   No No   Sig: Take 1 capsule (0.4 mg total) by mouth daily with dinner      Facility-Administered Medications: None       Portions of the record may have been created with voice recognition software. Occasional wrong word or \"sound " "a like\" substitutions may have occurred due to the inherent limitations of voice recognition software. Read the chart carefully and recognize, using context, where substitutions have occurred.     ED Course         Critical Care Time  Procedures      "

## 2024-11-05 ENCOUNTER — REMOTE DEVICE CLINIC VISIT (OUTPATIENT)
Dept: CARDIOLOGY CLINIC | Facility: CLINIC | Age: 69
End: 2024-11-05
Payer: COMMERCIAL

## 2024-11-05 DIAGNOSIS — Z95.810 PRESENCE OF AUTOMATIC CARDIOVERTER/DEFIBRILLATOR (AICD): Primary | ICD-10-CM

## 2024-11-05 PROCEDURE — 93296 REM INTERROG EVL PM/IDS: CPT | Performed by: INTERNAL MEDICINE

## 2024-11-05 PROCEDURE — 93295 DEV INTERROG REMOTE 1/2/MLT: CPT | Performed by: INTERNAL MEDICINE

## 2024-11-05 NOTE — PROGRESS NOTES
Results for orders placed or performed in visit on 11/05/24   Cardiac EP device report    Narrative    MDT DUAL ICD/ ACTIVE SYSTEM IS MRI CONDITIONAL  CARELINK TRANSMISSION: BATTERY VOLTAGE ADEQUATE. ( 11.3 YRS) AP 78%  <1%. ALL AVAILABLE LEAD PARAMETERS WITHIN NORMAL LIMITS. NO SIGNIFICANT HIGH RATE EPISODES. OPTI-VOL WITHIN NORMAL LIMITS. NORMAL DEVICE FUNCTION.---ARMANDO

## 2025-02-04 ENCOUNTER — REMOTE DEVICE CLINIC VISIT (OUTPATIENT)
Dept: CARDIOLOGY CLINIC | Facility: CLINIC | Age: 70
End: 2025-02-04
Payer: COMMERCIAL

## 2025-02-04 ENCOUNTER — RESULTS FOLLOW-UP (OUTPATIENT)
Dept: NON INVASIVE DIAGNOSTICS | Facility: HOSPITAL | Age: 70
End: 2025-02-04

## 2025-02-04 DIAGNOSIS — Z95.810 AICD (AUTOMATIC CARDIOVERTER/DEFIBRILLATOR) PRESENT: Primary | ICD-10-CM

## 2025-02-04 PROCEDURE — 93296 REM INTERROG EVL PM/IDS: CPT | Performed by: STUDENT IN AN ORGANIZED HEALTH CARE EDUCATION/TRAINING PROGRAM

## 2025-02-04 PROCEDURE — 93295 DEV INTERROG REMOTE 1/2/MLT: CPT | Performed by: STUDENT IN AN ORGANIZED HEALTH CARE EDUCATION/TRAINING PROGRAM

## 2025-02-04 NOTE — PROGRESS NOTES
Results for orders placed or performed in visit on 02/04/25   Cardiac EP device report    Narrative    MDT DUAL ICD/ ACTIVE SYSTEM IS MRI CONDITIONAL  CARELINK TRANSMISSION: BATTERY VOLTAGE ADEQUATE (10.9 YRS). AP-81%, -0.2%. ALL AVAILABLE LEAD PARAMETERS WITHIN NORMAL LIMITS. NO SIGNIFICANT HIGH RATE EPISODES. OPTI-VOL WITHIN NORMAL LIMITS. NORMAL DEVICE FUNCTION. GV

## 2025-05-06 ENCOUNTER — RESULTS FOLLOW-UP (OUTPATIENT)
Dept: NON INVASIVE DIAGNOSTICS | Facility: HOSPITAL | Age: 70
End: 2025-05-06

## 2025-05-06 ENCOUNTER — IN-CLINIC DEVICE VISIT (OUTPATIENT)
Dept: CARDIOLOGY CLINIC | Facility: CLINIC | Age: 70
End: 2025-05-06
Payer: COMMERCIAL

## 2025-05-06 DIAGNOSIS — Z95.810 PRESENCE OF IMPLANTABLE CARDIOVERTER-DEFIBRILLATOR (ICD): ICD-10-CM

## 2025-05-06 DIAGNOSIS — I25.5 ISCHEMIC CARDIOMYOPATHY: Primary | ICD-10-CM

## 2025-05-06 PROCEDURE — 93283 PRGRMG EVAL IMPLANTABLE DFB: CPT | Performed by: STUDENT IN AN ORGANIZED HEALTH CARE EDUCATION/TRAINING PROGRAM

## 2025-05-06 NOTE — PROGRESS NOTES
Results for orders placed or performed in visit on 05/06/25   Cardiac EP device report    Narrative    MDT DUAL ICD/ ACTIVE SYSTEM IS MRI CONDITIONAL  DEVICE INTERROGATED IN THE [place] OFFICE.  BATTERY VOLTAGE ADEQUATE (10.8 YRS).   AP 76.2%   <0.1%.  ALL LEAD PARAMETERS WITHIN NORMAL LIMITS.  NO SIGNIFICANT HIGH RATE EPISODES.  PT TAKES ASA, METOPROLOL SUCC. OPTI-VOL WITHIN NORMAL LIMITS.  NO PROGRAMMING CHANGES MADE TO DEVICE PARAMETERS.  NORMAL DEVICE FUNCTION. PAS

## (undated) DEVICE — WORKING LENGTH 235CM, WORKING CHANNEL 2.8MM: Brand: RESOLUTION 360 CLIP

## (undated) DEVICE — PLASMABLADE PS200-040 4.0: Brand: PLASMABLADE™

## (undated) DEVICE — INTRO SHEATH PEEL AWAY 7FR